# Patient Record
Sex: MALE | Race: WHITE | Employment: OTHER | URBAN - METROPOLITAN AREA
[De-identification: names, ages, dates, MRNs, and addresses within clinical notes are randomized per-mention and may not be internally consistent; named-entity substitution may affect disease eponyms.]

---

## 2021-02-02 ENCOUNTER — HOSPITAL ENCOUNTER (INPATIENT)
Age: 86
LOS: 3 days | Discharge: HOME | DRG: 881 | End: 2021-02-05
Attending: EMERGENCY MEDICINE | Admitting: PSYCHIATRY & NEUROLOGY
Payer: MEDICARE

## 2021-02-02 DIAGNOSIS — F32.A DEPRESSION WITH SUICIDAL IDEATION: Primary | ICD-10-CM

## 2021-02-02 DIAGNOSIS — R45.851 DEPRESSION WITH SUICIDAL IDEATION: Primary | ICD-10-CM

## 2021-02-02 PROBLEM — F32.9 MAJOR DEPRESSIVE DISORDER, SINGLE EPISODE: Status: ACTIVE | Noted: 2021-02-02

## 2021-02-02 LAB
ACETAMINOPHEN LEVEL: <5 MCG/ML (ref 10–30)
ALBUMIN SERPL-MCNC: 4.3 G/DL (ref 3.5–5.2)
ALP BLD-CCNC: 61 U/L (ref 40–129)
ALT SERPL-CCNC: 22 U/L (ref 0–40)
AMPHETAMINE SCREEN, URINE: NOT DETECTED
ANION GAP SERPL CALCULATED.3IONS-SCNC: 10 MMOL/L (ref 7–16)
AST SERPL-CCNC: 18 U/L (ref 0–39)
BARBITURATE SCREEN URINE: NOT DETECTED
BASOPHILS ABSOLUTE: 0.03 E9/L (ref 0–0.2)
BASOPHILS RELATIVE PERCENT: 0.4 % (ref 0–2)
BENZODIAZEPINE SCREEN, URINE: NOT DETECTED
BILIRUB SERPL-MCNC: 0.4 MG/DL (ref 0–1.2)
BILIRUBIN URINE: ABNORMAL
BLOOD, URINE: NEGATIVE
BUN BLDV-MCNC: 11 MG/DL (ref 8–23)
CALCIUM SERPL-MCNC: 9 MG/DL (ref 8.6–10.2)
CANNABINOID SCREEN URINE: NOT DETECTED
CHLORIDE BLD-SCNC: 103 MMOL/L (ref 98–107)
CLARITY: CLEAR
CO2: 29 MMOL/L (ref 22–29)
COCAINE METABOLITE SCREEN URINE: NOT DETECTED
COLOR: YELLOW
CREAT SERPL-MCNC: 1 MG/DL (ref 0.7–1.2)
EOSINOPHILS ABSOLUTE: 0.03 E9/L (ref 0.05–0.5)
EOSINOPHILS RELATIVE PERCENT: 0.4 % (ref 0–6)
ETHANOL: <10 MG/DL (ref 0–0.08)
FENTANYL SCREEN, URINE: NOT DETECTED
GFR AFRICAN AMERICAN: >60
GFR NON-AFRICAN AMERICAN: >60 ML/MIN/1.73
GLUCOSE BLD-MCNC: 103 MG/DL (ref 74–99)
GLUCOSE URINE: NEGATIVE MG/DL
HCT VFR BLD CALC: 55.7 % (ref 37–54)
HEMOGLOBIN: 17.6 G/DL (ref 12.5–16.5)
IMMATURE GRANULOCYTES #: 0.02 E9/L
IMMATURE GRANULOCYTES %: 0.3 % (ref 0–5)
KETONES, URINE: NEGATIVE MG/DL
LEUKOCYTE ESTERASE, URINE: NEGATIVE
LYMPHOCYTES ABSOLUTE: 0.87 E9/L (ref 1.5–4)
LYMPHOCYTES RELATIVE PERCENT: 12.5 % (ref 20–42)
Lab: NORMAL
MCH RBC QN AUTO: 29.1 PG (ref 26–35)
MCHC RBC AUTO-ENTMCNC: 31.6 % (ref 32–34.5)
MCV RBC AUTO: 92.1 FL (ref 80–99.9)
METHADONE SCREEN, URINE: NOT DETECTED
MONOCYTES ABSOLUTE: 0.46 E9/L (ref 0.1–0.95)
MONOCYTES RELATIVE PERCENT: 6.6 % (ref 2–12)
NEUTROPHILS ABSOLUTE: 5.55 E9/L (ref 1.8–7.3)
NEUTROPHILS RELATIVE PERCENT: 79.8 % (ref 43–80)
NITRITE, URINE: NEGATIVE
OPIATE SCREEN URINE: NOT DETECTED
OXYCODONE URINE: NOT DETECTED
PDW BLD-RTO: 14 FL (ref 11.5–15)
PH UA: 5.5 (ref 5–9)
PHENCYCLIDINE SCREEN URINE: NOT DETECTED
PLATELET # BLD: 152 E9/L (ref 130–450)
PMV BLD AUTO: 10.1 FL (ref 7–12)
POTASSIUM SERPL-SCNC: 4.1 MMOL/L (ref 3.5–5)
PROTEIN UA: NEGATIVE MG/DL
RBC # BLD: 6.05 E12/L (ref 3.8–5.8)
SALICYLATE, SERUM: <0.3 MG/DL (ref 0–30)
SARS-COV-2, NAAT: NOT DETECTED
SODIUM BLD-SCNC: 142 MMOL/L (ref 132–146)
SPECIFIC GRAVITY UA: >=1.03 (ref 1–1.03)
TOTAL PROTEIN: 7 G/DL (ref 6.4–8.3)
TRICYCLIC ANTIDEPRESSANTS SCREEN SERUM: NEGATIVE NG/ML
UROBILINOGEN, URINE: 0.2 E.U./DL
WBC # BLD: 7 E9/L (ref 4.5–11.5)

## 2021-02-02 PROCEDURE — U0002 COVID-19 LAB TEST NON-CDC: HCPCS

## 2021-02-02 PROCEDURE — 82077 ASSAY SPEC XCP UR&BREATH IA: CPT

## 2021-02-02 PROCEDURE — 80143 DRUG ASSAY ACETAMINOPHEN: CPT

## 2021-02-02 PROCEDURE — 81003 URINALYSIS AUTO W/O SCOPE: CPT

## 2021-02-02 PROCEDURE — 80053 COMPREHEN METABOLIC PANEL: CPT

## 2021-02-02 PROCEDURE — 80179 DRUG ASSAY SALICYLATE: CPT

## 2021-02-02 PROCEDURE — 1240000000 HC EMOTIONAL WELLNESS R&B

## 2021-02-02 PROCEDURE — 80307 DRUG TEST PRSMV CHEM ANLYZR: CPT

## 2021-02-02 PROCEDURE — 93005 ELECTROCARDIOGRAM TRACING: CPT | Performed by: EMERGENCY MEDICINE

## 2021-02-02 PROCEDURE — 99284 EMERGENCY DEPT VISIT MOD MDM: CPT

## 2021-02-02 PROCEDURE — 85025 COMPLETE CBC W/AUTO DIFF WBC: CPT

## 2021-02-02 RX ORDER — ACETAMINOPHEN 325 MG/1
650 TABLET ORAL EVERY 4 HOURS PRN
Status: DISCONTINUED | OUTPATIENT
Start: 2021-02-02 | End: 2021-02-05 | Stop reason: HOSPADM

## 2021-02-02 RX ORDER — HALOPERIDOL 1 MG/1
3 TABLET ORAL EVERY 6 HOURS PRN
Status: DISCONTINUED | OUTPATIENT
Start: 2021-02-02 | End: 2021-02-05 | Stop reason: HOSPADM

## 2021-02-02 RX ORDER — HALOPERIDOL 5 MG/ML
3 INJECTION INTRAMUSCULAR EVERY 6 HOURS PRN
Status: DISCONTINUED | OUTPATIENT
Start: 2021-02-02 | End: 2021-02-05 | Stop reason: HOSPADM

## 2021-02-02 RX ORDER — TRAZODONE HYDROCHLORIDE 50 MG/1
25 TABLET ORAL NIGHTLY PRN
Status: DISCONTINUED | OUTPATIENT
Start: 2021-02-02 | End: 2021-02-05 | Stop reason: HOSPADM

## 2021-02-02 RX ORDER — MAGNESIUM HYDROXIDE/ALUMINUM HYDROXICE/SIMETHICONE 120; 1200; 1200 MG/30ML; MG/30ML; MG/30ML
30 SUSPENSION ORAL PRN
Status: DISCONTINUED | OUTPATIENT
Start: 2021-02-02 | End: 2021-02-05 | Stop reason: HOSPADM

## 2021-02-02 ASSESSMENT — PATIENT HEALTH QUESTIONNAIRE - PHQ9: SUM OF ALL RESPONSES TO PHQ QUESTIONS 1-9: 8

## 2021-02-02 ASSESSMENT — SLEEP AND FATIGUE QUESTIONNAIRES
SLEEP PATTERN: DIFFICULTY FALLING ASLEEP
DIFFICULTY FALLING ASLEEP: YES
RESTFUL SLEEP: YES
DO YOU HAVE DIFFICULTY SLEEPING: YES
DIFFICULTY ARISING: NO
DIFFICULTY STAYING ASLEEP: NO

## 2021-02-02 ASSESSMENT — LIFESTYLE VARIABLES: HISTORY_ALCOHOL_USE: NO

## 2021-02-02 NOTE — ED PROVIDER NOTES
Department of Emergency Medicine   ED  Provider Note  Admit Date/RoomTime: 2021 12:26 PM  ED Room: [facility]              21  2:12 PM EST    HPI: Angela Amaro 80 y.o. male presents with a complaint of depression and suicidal ideation beginning a few days ago. Complaint has been constant and became more severe today which is what prompted the visit. Presents for evaluation depression and suicidal thoughts. He reports that he is depressed since he lost his partner 1 year ago. He reports he feels helpless and hopeless and has nothing to live for. He reports that he is too chicken to kill himself but is having suicidal thoughts. He says things continue to go downhill since she . He denies other complaints. Review of Systems:   A complete review of systems was performed and pertinent positives and negatives are stated within HPI, all other systems reviewed and are negative.            --------------------------------------------- PAST HISTORY ---------------------------------------------  Past Medical History:  has a past medical history of BPH (benign prostatic hyperplasia), CAD (coronary artery disease), Diverticulosis, Hyperlipidemia, Hypertension, and MI (myocardial infarction) ([facility] [address] Past Surgical History:  has a past surgical history that includes Neck surgery; Cardiac catheterization (2012); Coronary artery bypass graft (12); Diagnostic Cardiac Cath Lab Procedure; and Colonoscopy (). Social History:  reports that he has never smoked. He has never used smokeless tobacco. He reports that he does not drink alcohol or use drugs. Family History: family history includes Heart Attack in his mother; Heart Disease in his mother. Family history is non contributory unless otherwise noted    The patients home medications have been reviewed. Allergies: Patient has no known allergies. -------------------------------------------------- RESULTS -------------------------------------------------  All laboratory and imaging studies were reviewed by myself.     LABS: reviewed and interpreted by me  Results for orders placed or performed during the hospital encounter of 02/02/21   COVID-19   Result Value Ref Range    SARS-CoV-2, NAAT Not Detected Not Detected   Comprehensive Metabolic Panel   Result Value Ref Range    Sodium 142 132 - 146 mmol/L    Potassium 4.1 3.5 - 5.0 mmol/L    Chloride 103 98 - 107 mmol/L    CO2 29 22 - 29 mmol/L    Anion Gap 10 7 - 16 mmol/L    Glucose 103 (H) 74 - 99 mg/dL    BUN 11 8 - 23 mg/dL    CREATININE 1.0 0.7 - 1.2 mg/dL    GFR Non-African American >60 >=60 mL/min/1.73    GFR African American >60     Calcium 9.0 8.6 - 10.2 mg/dL    Total Protein 7.0 6.4 - 8.3 g/dL    Albumin 4.3 3.5 - 5.2 g/dL    Total Bilirubin 0.4 0.0 - 1.2 mg/dL    Alkaline Phosphatase 61 40 - 129 U/L    ALT 22 0 - 40 U/L    AST 18 0 - 39 U/L   CBC Auto Differential   Result Value Ref Range    WBC 7.0 4.5 - 11.5 E9/L    RBC 6.05 (H) 3.80 - 5.80 E12/L    Hemoglobin 17.6 (H) 12.5 - 16.5 g/dL    Hematocrit 55.7 (H) 37.0 - 54.0 %    MCV 92.1 80.0 - 99.9 fL    MCH 29.1 26.0 - 35.0 pg    MCHC 31.6 (L) 32.0 - 34.5 %    RDW 14.0 11.5 - 15.0 fL    Platelets 842 145 - 072 E9/L    MPV 10.1 7.0 - 12.0 fL    Neutrophils % 79.8 43.0 - 80.0 %    Immature Granulocytes % 0.3 0.0 - 5.0 %    Lymphocytes % 12.5 (L) 20.0 - 42.0 %    Monocytes % 6.6 2.0 - 12.0 %    Eosinophils % 0.4 0.0 - 6.0 %    Basophils % 0.4 0.0 - 2.0 %    Neutrophils Absolute 5.55 1.80 - 7.30 E9/L    Immature Granulocytes # 0.02 E9/L    Lymphocytes Absolute 0.87 (L) 1.50 - 4.00 E9/L    Monocytes Absolute 0.46 0.10 - 0.95 E9/L    Eosinophils Absolute 0.03 (L) 0.05 - 0.50 E9/L    Basophils Absolute 0.03 0.00 - 0.20 E9/L   Serum Drug Screen   Result Value Ref Range    Ethanol Lvl <10 mg/dL    Acetaminophen Level <5.0 (L) 10.0 - 30.0 mcg/mL Salicylate, Serum <0.6 0.0 - 30.0 mg/dL    TCA Scrn NEGATIVE Cutoff:300 ng/mL   Urine Drug Screen   Result Value Ref Range    Amphetamine Screen, Urine NOT DETECTED Negative <1000 ng/mL    Barbiturate Screen, Ur NOT DETECTED Negative < 200 ng/mL    Benzodiazepine Screen, Urine NOT DETECTED Negative < 200 ng/mL    Cannabinoid Scrn, Ur NOT DETECTED Negative < 50ng/mL    Cocaine Metabolite Screen, Urine NOT DETECTED Negative < 300 ng/mL    Opiate Scrn, Ur NOT DETECTED Negative < 300ng/mL    PCP Screen, Urine NOT DETECTED Negative < 25 ng/mL    Methadone Screen, Urine NOT DETECTED Negative <300 ng/mL    Oxycodone Urine NOT DETECTED Negative <100 ng/mL    FENTANYL SCREEN, URINE NOT DETECTED Negative <1 ng/mL    Drug Screen Comment: see below    Urinalysis   Result Value Ref Range    Color, UA Yellow Straw/Yellow    Clarity, UA Clear Clear    Glucose, Ur Negative Negative mg/dL    Bilirubin Urine SMALL (A) Negative    Ketones, Urine Negative Negative mg/dL    Specific Gravity, UA >=1.030 1.005 - 1.030    Blood, Urine Negative Negative    pH, UA 5.5 5.0 - 9.0    Protein, UA Negative Negative mg/dL    Urobilinogen, Urine 0.2 <2.0 E.U./dL    Nitrite, Urine Negative Negative    Leukocyte Esterase, Urine Negative Negative       RADIOLOGY:  Interpreted by Radiologist.  No orders to display       EKG Interpretation  Interpreted by emergency department physician, Dr. Lucy Brown     Rhythm: normal sinus   Rate: normal  Axis: left  Conduction: right bundle branch block (complete)  ST Segments: no acute change  T Waves: no acute change    Clinical Impression: Sinus rhythm, no acute ischemic changes    Comparison to Old EKG  Stable from prior        ------------------------- NURSING NOTES AND VITALS REVIEWED ---------------------------   The nursing notes within the ED encounter and vital signs as below have been reviewed. BP (!) 168/98   Pulse 86   Temp 97.5 °F (36.4 °C)   Resp 18   Ht 5' 10\" (1.778 m)   Wt 185 lb (83.9 kg)   SpO2 93%   BMI 26.54 kg/m²   Oxygen Saturation Interpretation: Normal            ---------------------------------------------------PHYSICAL EXAM--------------------------------------      Constitutional/General: Alert and oriented x3   Head: Normocephalic, atraumatic  Eyes: PERRL, EOMI  Mouth: Oropharynx clear, handling secretions, no trismus  Neck: Supple, full ROM, no meningeal signs  Pulmonary: Lungs clear to auscultation bilaterally, no wheezes, rales, or rhonchi. Not in respiratory distress  Cardiovascular:  Regular rate and rhythm, no murmurs, gallops, or rubs. 2+ distal pulses  Abdomen: Soft, non tender, non distended, +BS, no rebound, guarding, or rigidity. No pulsatile masses appreciated  Extremities: Moves all extremities x 4. Warm and well perfused, no clubbing, cyanosis, or edema. Capillary refill <3 seconds  Skin: warm and dry without rash  Neurologic: GCS 15, no focal deficits  Psych: flat Affect      ------------------------------------------ PROGRESS NOTES ------------------------------------------     Medical decision making:  Patient is medically stable for mental health evaluation    Consultations:   Behavioral Health    Re-Evaluations:        Counseling: The emergency provider has spoken with thepatient and discussed todays results, in addition to providing specific details for the plan of care and counseling regarding the diagnosis and prognosis. Questions are answered at this time and they are agreeable with the plan.         --------------------------------- IMPRESSION AND DISPOSITION ---------------------------------    IMPRESSION  1.  Depression with suicidal ideation        DISPOSITION  Disposition: as per consultant  Patient condition is stable           Marily Moon MD  02/02/21 [id number]

## 2021-02-03 LAB
CHOLESTEROL, TOTAL: 191 MG/DL (ref 0–199)
EKG ATRIAL RATE: 74 BPM
EKG P AXIS: -3 DEGREES
EKG P-R INTERVAL: 200 MS
EKG Q-T INTERVAL: 420 MS
EKG QRS DURATION: 108 MS
EKG QTC CALCULATION (BAZETT): 466 MS
EKG R AXIS: -44 DEGREES
EKG T AXIS: 13 DEGREES
EKG VENTRICULAR RATE: 74 BPM
HBA1C MFR BLD: 6 % (ref 4–5.6)
HDLC SERPL-MCNC: 44 MG/DL
LDL CHOLESTEROL CALCULATED: 105 MG/DL (ref 0–99)
TRIGL SERPL-MCNC: 212 MG/DL (ref 0–149)
TSH SERPL DL<=0.05 MIU/L-ACNC: 1.41 UIU/ML (ref 0.27–4.2)
VLDLC SERPL CALC-MCNC: 42 MG/DL

## 2021-02-03 PROCEDURE — 83036 HEMOGLOBIN GLYCOSYLATED A1C: CPT

## 2021-02-03 PROCEDURE — 6370000000 HC RX 637 (ALT 250 FOR IP): Performed by: NURSE PRACTITIONER

## 2021-02-03 PROCEDURE — 99222 1ST HOSP IP/OBS MODERATE 55: CPT | Performed by: NURSE PRACTITIONER

## 2021-02-03 PROCEDURE — 1240000000 HC EMOTIONAL WELLNESS R&B

## 2021-02-03 PROCEDURE — 84443 ASSAY THYROID STIM HORMONE: CPT

## 2021-02-03 PROCEDURE — 6370000000 HC RX 637 (ALT 250 FOR IP): Performed by: PSYCHIATRY & NEUROLOGY

## 2021-02-03 PROCEDURE — 93010 ELECTROCARDIOGRAM REPORT: CPT | Performed by: INTERNAL MEDICINE

## 2021-02-03 PROCEDURE — 80061 LIPID PANEL: CPT

## 2021-02-03 PROCEDURE — 36415 COLL VENOUS BLD VENIPUNCTURE: CPT

## 2021-02-03 RX ORDER — DULOXETIN HYDROCHLORIDE 20 MG/1
20 CAPSULE, DELAYED RELEASE ORAL 2 TIMES DAILY
Status: DISCONTINUED | OUTPATIENT
Start: 2021-02-03 | End: 2021-02-05 | Stop reason: HOSPADM

## 2021-02-03 RX ORDER — ASPIRIN 81 MG/1
81 TABLET, CHEWABLE ORAL DAILY
Status: DISCONTINUED | OUTPATIENT
Start: 2021-02-03 | End: 2021-02-05 | Stop reason: HOSPADM

## 2021-02-03 RX ORDER — LISINOPRIL 10 MG/1
10 TABLET ORAL DAILY
Status: DISCONTINUED | OUTPATIENT
Start: 2021-02-03 | End: 2021-02-04

## 2021-02-03 RX ORDER — ATORVASTATIN CALCIUM 20 MG/1
20 TABLET, FILM COATED ORAL NIGHTLY
Status: DISCONTINUED | OUTPATIENT
Start: 2021-02-03 | End: 2021-02-04

## 2021-02-03 RX ADMIN — ASPIRIN 81 MG: 81 TABLET, CHEWABLE ORAL at 13:54

## 2021-02-03 RX ADMIN — METOPROLOL TARTRATE 25 MG: 25 TABLET, FILM COATED ORAL at 21:01

## 2021-02-03 RX ADMIN — ATORVASTATIN CALCIUM 20 MG: 20 TABLET, FILM COATED ORAL at 21:01

## 2021-02-03 RX ADMIN — LISINOPRIL 10 MG: 10 TABLET ORAL at 16:41

## 2021-02-03 RX ADMIN — ACETAMINOPHEN 650 MG: 325 TABLET, FILM COATED ORAL at 14:19

## 2021-02-03 RX ADMIN — METOPROLOL TARTRATE 25 MG: 25 TABLET, FILM COATED ORAL at 13:54

## 2021-02-03 RX ADMIN — DULOXETINE HYDROCHLORIDE 20 MG: 20 CAPSULE, DELAYED RELEASE ORAL at 21:01

## 2021-02-03 ASSESSMENT — SLEEP AND FATIGUE QUESTIONNAIRES
SLEEP PATTERN: DIFFICULTY FALLING ASLEEP
RESTFUL SLEEP: YES
DIFFICULTY ARISING: NO
DIFFICULTY FALLING ASLEEP: YES

## 2021-02-03 ASSESSMENT — PATIENT HEALTH QUESTIONNAIRE - PHQ9: SUM OF ALL RESPONSES TO PHQ QUESTIONS 1-9: 8

## 2021-02-03 ASSESSMENT — LIFESTYLE VARIABLES: HISTORY_ALCOHOL_USE: NO

## 2021-02-03 NOTE — H&P
Department of Psychiatry  History and Physical - Adult     CHIEF COMPLAINT: \"Things gone downhill since my wife . \"  Patient was seen after discussing with the treatment team and reviewing the chart    CIRCUMSTANCES OF ADMISSION:   Patient made statements at the bank that he wanted to hurt himself, he was then presented to [facility] emergency department, with depression feelings of helplessness, passive suicidal ideation. HISTORY OF PRESENT ILLNESS:      The patient is a 80 y.o.  male who is recently  and has 2 estranged adult children, who follows with the [place] for medical management, presented to [facility] emergency department for making statements at the bank that he wanted to harm himself, further in the emergency department he expressed feelings of looseness with passive suicidal ideation and depression beginning after the loss of his wife. His toxicology and EtOH screen in the ED were negative, he was medically cleared and admitted to [facility] for psychiatric evaluation and stabilization. Upon assessment today the patient is polite and appropriate, he is easily able to recount the events leading to his hospitalization. He is able to answer questions with relevance and is alert and oriented to person place time and situation. He tells me that he has been sad with feelings of helplessness depression since the loss of his wife 1 year ago. States that they were together for 29 years. He is sad and depressed. Describing himself as \"heart sick. \"  He states that he has an appointment with the [place] for his grief yesterday but missed the appointment because he was in the emergency department at the hospital.  He denies auditory or visual hallucinations or other perceptual disturbances. He states his mood is \"sad. \"  His affect is depressed. He is lonely. He denies suicidal or homicidal ideation intent or plan, stating that he would rather \"go to sleep and not wake up. \"  He states that he is \"too much of a chicken. \"  To harm himself. 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 He lives alone, and recently sold his house.     MSE: Mental status reveals an 59-year-old  male in hospital attire who appears stated age with fair hygiene and fair grooming he is superficially forthcoming with information and easily able to recount the events leading to his hospitalization. Psychomotor reveals no retardation, agitation or abnormal posturing. Speech is clear there is no pressured rate rhythm or tone there is no delayed latency of response. Eye contact is good. Mood is \"sad. \"  Affect is depressed. Thought process is logical and there are no loose associations or flight of ideas. Thought content is the patient is devoid of suicidal or homicidal ideation intent or plan. Devoid of auditory or visual hallucinations or other perceptual disturbances, there are no overt or covert signs of psychosis or paranoia. There areneurovegetative signs of depression including feelings of helplessness, loneliness,. Past Medical History:        Diagnosis Date    BPH (benign prostatic hyperplasia)     CAD (coronary artery disease)     Diverticulosis     Hyperlipidemia     Hypertension     [facility] ([facility] infarction) ([facility] [address]     RBBB     Tobacco abuse        Medications Prior to Admission:   Medications Prior to Admission: metoprolol (LOPRESSOR) 25 MG tablet, Take 25 mg by mouth 2 times daily  Fish Oil OIL, Take  by mouth daily. aspirin 81 MG chewable tablet, Take 1 tablet by mouth daily. simvastatin (ZOCOR) 20 MG tablet, Take 1 tablet by mouth nightly. finasteride (PROSCAR) 5 MG tablet, Take 5 mg by mouth daily. therapeutic multivitamin-minerals (THERAGRAN-M) tablet, Take 1 tablet by mouth daily. Past Surgical History:        Procedure Laterality Date    CARDIAC CATHETERIZATION  4-    Dr. Tunde Damon    COLONOSCOPY  2013    CORONARY ARTERY BYPASS GRAFT  4/18/12    acb x 3- Dr. Alyse Trejo LAB PROCEDURE      NECK SURGERY         Allergies:   Patient has no known allergies.     Family History OPIATESCREENURINE NOT DETECTED 02/02/2021    PHENCYCLIDINESCREENURINE NOT DETECTED 02/02/2021    ETOH <10 02/02/2021     Lab Results   Component Value Date    TSH 2.390 04/18/2012     No results found for: LITHIUM  No results found for: VALPROATE, CBMZ  No results found for: LITHIUM, VALPROATE      Radiology No results found. TREATMENT PLAN:  The plan of care and medications have been reviewed with Dr. Margaux Mas in the collaborative care team.  Risk Management: Based on the diagnosis and assessment biopsychosocial treatment model was presented to the patient and was given the opportunity to ask any question. The patient was agreeable to the plan and all the patient's questions were answered to the patient's satisfaction. I discussed with the patient the risk, benefit, alternative and common side effects for the proposed medication treatment. The patient is consenting to this treatment. Collateral Information:  Will obtain collateral information from the family or friends. Will obtain medical records as appropriate from out patient providers  Will consult the hospitalist for a physical exam to rule out any co-morbid physical condition. Home medication Reconciled       New Medications started during this admission :    Duloxetine 20 mg twice daily for depression    Prn Haldol 5mg and Vistaril 50mg q6hr for extreme agitation. Trazodone as ordered for insomnia  Vistaril as ordered for anxiety      Psychotherapy:   Encourage participation in milieu and group therapy  Individual therapy as needed  Patient encouraged to follow-up with the services      Behavioral Services  Medicare Certification      Admission Day 1  I certify that this patient's inpatient psychiatric hospital admission is medically necessary for:     (1) treatment which could reasonably be expected to improve this patient's condition, or     (2) diagnostic study or its equivalent. Electronically signed by JENNA Yost CNP on 2/3/2021 at 1:22 PM

## 2021-02-03 NOTE — PROGRESS NOTES
[facility]  Initial Interdisciplinary Treatment Plan NOTE    Review Date & Time: 2/3/2021    Patient was in treatment team    Admission Type:   Admission Type:  Involuntary    Reason for admission:  Reason for Admission: \"to get evaluated\"      Estimated Length of Stay Update:  3-5 days  Estimated Discharge Date Update: 2/8/2021    PATIENT STRENGTHS:  Patient Strengths Strengths: Communication  Patient Strengths and Limitations:Limitations: Multiple barriers to leisure interests  Addictive Behavior:Addictive Behavior  In the past 3 months, have you felt or has someone told you that you have a problem with:  : None  Do you have a history of Chemical Use?: No  Do you have a history of Alcohol Use?: No  Do you have a history of Street Drug Abuse?: No  Histroy of Prescripton Drug Abuse?: No  Medical Problems:  Past Medical History:   Diagnosis Date    BPH (benign prostatic hyperplasia)     CAD (coronary artery disease)     Diverticulosis     Hyperlipidemia     Hypertension     [facility] (my[facility]) ([facility] [address]     RBBB     Tobacco abuse        EDUCATION:   Learner Progress Toward Treatment Goals: Reviewed results and recommendations of this team, Reviewed group plan and strategies, Reviewed signs, symptoms and risk of self harm and violent behavior and Reviewed goals and plan of care    Method: Small group    Outcome: Verbalized understanding and Needs reinforcement    PATIENT GOALS:  Get some sleep    PLAN/TREATMENT RECOMMENDATIONS UPDATE: will continue to support patient in setting and obtaining both short term and long term goals while on the unit    GOALS UPDATE:   Time frame for Short-Term Goals: 1-3 days    Iván Archibald RN

## 2021-02-03 NOTE — PROGRESS NOTES
Messages left at [phone] and [phone] on nurse line at the [facility] for medications verification. Patient is being seen by Dr. Melo Mae team 7 provider. Still awaiting return call for continuations of medical care. [id number]  Follow up done with [address]. State they do not have a current list of meds beside vitamins. Per staff the vitamins are listed as from outside provider. Patient states he does not see anyone else as a provider beside the [place] and that he has not been taking meds for about 5 years and needs to start back on something.

## 2021-02-03 NOTE — PROGRESS NOTES
Patient has been out on the unit sitting in reclining chair throughout the night. States that he cannot sleep very well at night since his wife passed away. Patient is calm and cooperative during conversation. Makes needs known. Denies suicidal/homicidal ideations and hallucinations at this time. Purposeful rounding continued.

## 2021-02-03 NOTE — PROGRESS NOTES
Patient reports that he was at the [employer] and the bank asked him if he would mind if he came to the hospital for an evaluation. Patient reports that he has had suicidal thoughts but reports that he would never do it because he is \"too much of a coward\". Patient is pleasant and cooperative during admission assessment. Patient did not wish to sign admission paperwork and reports that he would like to speak with his  first, otherwise is cooperative with admission. Admits to depression and rates it \"more than a 5\". Patient reports that he sees Dr. Samantha Wilson at the [facility]. Admits that he has not been compliant with prescribed medications. Patient reports an increase in depression after his wife of 29 years passed away in March of 2020. Reports that he lost about 20 pounds when she passed away but has been gaining it back. Also reports that he has had some trouble sleeping since her passing away, about 3 hours a night, but is \"getting better\". Denies taking any medications for sleep at this time. Reports that he lives alone in a 2 bedroom apartment. Patient reports that he does not have a relationship with his children due to the divorce from their mother, his first wife. Reports that his friends are his support system. No unit issues reported. Will continue to observe and support.

## 2021-02-03 NOTE — CONSULTS
Hospital Medicine  Consult History & Physical        Reason for consult:  Medical management    Date of Service: Pt seen/examined in consultation on 2/3/2021    History Of Present Illness:    Mr. Tasha Mendieta, a 80y.o. year old male  who  has a past medical history of BPH (benign prostatic hyperplasia), CAD (coronary artery disease), Diverticulosis, Hyperlipidemia, Hypertension, MI (myocardial infarction) ([address] and Tobacco abuse. Patient presented to the ED with suicidal ideation beginning 3 days prior to arrival.  He states that he has been depressed since he lost his partner 1 year ago. He does not have a plan and states that he is too afraid to actually kill himself. He was admitted to [facility] and we were asked to see/evaluate the patient for medical management. The patient states that he does see his PCP at the  [facility], but he has not taken his medication since his wife  approximately 1 year ago. He is a poor historian, but notes that he has high blood pressure and history of a CABG. He has no complaints at this time, but still voices suicidal ideation. Past Medical History:        Diagnosis Date    BPH (benign prostatic hyperplasia)     CAD (coronary artery disease)     Diverticulosis     Hyperlipidemia     Hypertension     MI (myocardial infarction) ([address]     Tobacco abuse        Past Surgical History:        Procedure Laterality Date    CARDIAC CATHETERIZATION  2012    Dr. John Clemente    COLONOSCOPY      CORONARY ARTERY BYPASS GRAFT  12    acb x 3- Dr. Valerie Martinez         Medications Prior to Admission:    Prior to Admission medications    Medication Sig Start Date End Date Taking? Authorizing Provider   metoprolol (LOPRESSOR) 25 MG tablet Take 25 mg by mouth 2 times daily    Historical Provider, MD   Fish Oil OIL Take  by mouth daily.     Historical Provider, MD aspirin 81 MG chewable tablet Take 1 tablet by mouth daily. 4/24/12   Khoi Gregory DO   simvastatin (ZOCOR) 20 MG tablet Take 1 tablet by mouth nightly. 4/24/12   Khoi Gregory DO   finasteride (PROSCAR) 5 MG tablet Take 5 mg by mouth daily. Historical Provider, MD   therapeutic multivitamin-minerals (THERAGRAN-M) tablet Take 1 tablet by mouth daily. Historical Provider, MD       Allergies:  Patient has no known allergies. Social History:      TOBACCO:   reports that he has never smoked. He has never used smokeless tobacco.  ETOH:   reports no history of alcohol use. Family History:    Positive as follows:        Problem Relation Age of Onset    Heart Attack Mother     Heart Disease Mother        REVIEW OF SYSTEMS:   Pertinent positives as noted in the HPI. All other systems reviewed and negative. PHYSICAL EXAM:  BP (!) 195/114   Pulse 78   Temp 97.5 °F (36.4 °C) (Temporal)   Resp 17   Ht 5' 10\" (1.778 m)   Wt 185 lb (83.9 kg)   SpO2 94%   BMI 26.54 kg/m²      General appearance: Elderly gentleman in no apparent distress, appears stated age and cooperative. HEENT: Normal cephalic, atraumatic without obvious deformity. Pupils equal, round, and reactive to light. Extra ocular muscles intact. Conjunctivae/corneas clear. Neck: Supple, with full range of motion. No jugular venous distention. Trachea midline. Respiratory:  Rhonchi that clears with coughing then CTA throughout. No apparent distress. Cardiovascular:  Regular rate and rhythm. S1, S2 without murmurs, rubs, or gallops. PV: Brisk capillary refill. +2 pedal and radial pulses bilaterally. No clubbing or cyanosis. +1 pitting edema of bilateral lower extremities. Abdomen: Soft, non-tender, non-distended. +BS  Musculoskeletal: No obvious deformities or erythematous or edematous joints. Skin: Normal skin color. No rashes or lesions. Neurologic:  Neurovascularly intact without any focal sensory/motor deficits. Cranial nerves: II-XII intact, grossly non-focal.  Psychiatric: Alert and oriented, thought content appropriate, normal insight    Labs:     CBC:   Recent Labs     02/02/21  1336   WBC 7.0   RBC 6.05*   HGB 17.6*   HCT 55.7*   MCV 92.1   RDW 14.0        BMP:   Recent Labs     02/02/21  1336      K 4.1      CO2 29   BUN 11   CREATININE 1.0     LFT:  Recent Labs     02/02/21  1336   PROT 7.0   ALKPHOS 61   ALT 22   AST 18   BILITOT 0.4     Hgb A1C:   Lab Results   Component Value Date    LABA1C 5.5 04/22/2012     D Dimer:   Lab Results   Component Value Date    DDIMER <250 04/16/2012     Thyroid Studies:   Lab Results   Component Value Date    TSH 2.390 04/18/2012     ASSESSMENT:  Principal Problem:    Major depressive disorder, single episode  Active Problems:    3-vessel CAD    S/P CABG x 3    HTN (hypertension)    Hyperlipidemia    BPH (benign prostatic hyperplasia)    Tobacco abuse    RBBB  Resolved Problems:    * No resolved hospital problems. *     PLAN:  Check lipids, hemoglobin A1c, TSH  Metoprolol 25 BID, lisinopril 10 mg,   ASA, Lipitor 20 mg  Monitor BP  Nurses attempting to obtain med list from PCP. Thanks for allowing us to participate in the care of Karma Whaley. We will continue to follow along. Please do not hesitate to contact us if needed.    +++++++++++++++++++++++++++++++++++++++++++++++++  [facility] [place] [address]  +++++++++++++++++++++++++++++++++++++++++++++++++  NOTE: This report was transcribed using voice recognition software. Every effort was made to ensure accuracy; however, inadvertent computerized transcription errors may be present.

## 2021-02-03 NOTE — PROGRESS NOTES
`[facility]  Admission Note     Admission Type:   Admission Type: Involuntary    Reason for admission:  Reason for Admission: \"to get evaluated\"    PATIENT STRENGTHS:  Strengths: Communication, Social Skills, Positive Support    Patient Strengths and Limitations:  Limitations: Multiple barriers to leisure interests    Addictive Behavior:   Addictive Behavior  In the past 3 months, have you felt or has someone told you that you have a problem with:  : None  Do you have a history of Chemical Use?: No  Do you have a history of Alcohol Use?: No  Do you have a history of Street Drug Abuse?: No  Histroy of Prescripton Drug Abuse?: No    Medical Problems:   Past Medical History:   Diagnosis Date    BPH (benign prostatic hyperplasia)     CAD (coronary artery disease)     Diverticulosis     Hyperlipidemia     Hypertension     MI (myocardial infarction) ([facility] [address]        Status EXAM:  Status and Exam  Normal: No  Facial Expression: Flat(brightens with conversation)  Affect: Appropriate  Level of Consciousness: Alert  Mood:Normal: No  Mood: Depressed(depression \"above 5\" denies anxiety)  Motor Activity:Normal: Yes  Interview Behavior: Cooperative  Preception: [place] to Person, [place] to Time, [place] to Place, [place] to Situation  Attention:Normal: Yes  Thought Processes: Other(See comment)(organized)  Thought Content:Normal: Yes  Hallucinations:  Other (Comment)(\"I see visions out of no where, figures\")  Delusions: No  Memory:Normal: No  Memory: Other(See comment)(impaired)  Insight and Judgment: No  Insight and Judgment: Other(See comment)(patient does appear to have some insight)  Present Suicidal Ideation: No  Present Homicidal Ideation: No    Tobacco Screening:  Practical Counseling, on admission, nimco X, if applicable and completed (first 3 are required if patient doesn't refuse):            ( )  Recognizing danger situations (included triggers and roadblocks) ( )  Coping skills (new ways to manage stress, exercise, relaxation techniques, changing routine, distraction)                                                           ( )  Basic information about quitting (benefits of quitting, techniques in how to quit, available resources  ( ) Referral for counseling faxed to [facility]                                           ( ) Patient refused counseling  ( x) Patient has not smoked in the last 30 days    Metabolic Screening:    Lab Results   Component Value Date    LABA1C 5.5 04/22/2012       No results for input(s): CHOL, TRIG, HDL, LDLCALC, LABVLDL in the last 72 hours. Body mass index is 26.54 kg/m². BP Readings from Last 2 Encounters:   02/02/21 (!) 173/83   05/28/14 118/76           Pt admitted with followings belongings:  Dentures: None  Vision - Corrective Lenses: Glasses  Hearing Aid: None  Jewelry: Ring  Body Piercings Removed: N/A  Clothing: Jacket / coat, Shirt, Other (Comment)(hat, shirt, jacket)  Were All Patient Medications Collected?: Not Applicable  Other Valuables: Cell phone, [employer], Other (Comment)(cellphone, key, 2 pocket books, treasury check)     Valuables sent home with n/a. Valuables placed in safe in security envelope, number:  n/a. Patient's home medications were n/a. Patient oriented to surroundings and program expectations and copy of patient rights given. Received admission packet:  yes. Consents reviewed, signed no, pt wants to discuss with his  before signing. Refused to sign paperwork tonight. Patient verbalize understanding:  yes.     Patient education on precautions: yes                   Ludmila Page RN

## 2021-02-03 NOTE — GROUP NOTE
Group Therapy Note    Date: 2/3/2021    Group Start Time: 1010  Group End Time: 1100  Group Topic: Psychoeducation    [facility]    Dorene Pichardo, CTRS        Group Therapy Note    Number of participants: 7  Type of group: Psychoeducation  Mode of intervention: Education, Support, Socialization, Exploration, Clarifying, and Problem-solving  Topic: Tips for Managing Stress  Objective: Pt will identify 1 way to manage stress in recovery. Patient's Goal:  \"Get some sleep\"     Notes:  Pt was interactive during group sharing 1 way to manage stress in recovery. Pt gave support and feedback to others. Status After Intervention:  Improved    Participation Level:  Active Listener and Interactive    Participation Quality: Appropriate, Attentive, Sharing and Supportive      Speech:  normal      Thought Process/Content: Logical      Affective Functioning: Congruent      Mood: euthymic      Level of consciousness:  Alert, Oriented x4 and Attentive      Response to Learning: Able to verbalize current knowledge/experience, Able to verbalize/acknowledge new learning, Able to retain information, Capable of insight, Able to change behavior and Progressing to goal      Endings: None Reported    Modes of Intervention: Education, Support, Socialization, Exploration, Clarifying and Problem-solving

## 2021-02-03 NOTE — PROGRESS NOTES
Unable to verify patient's home medications due to [address] being closed at this time.

## 2021-02-03 NOTE — PLAN OF CARE
Problem: Altered Mood, Depressive Behavior:  Goal: Ability to disclose and discuss suicidal ideas will improve  Description: Ability to disclose and discuss suicidal ideas will improve  Outcome: Met This Shift

## 2021-02-03 NOTE — PLAN OF CARE
Patient is alert and oriented sitting in day area states he is depressed and did not assign a number just states it is just high because he is missing his wife. Patient states he is sad because his wife has passed and they were  for 29 years. Patient does not have any belief in in seeing his wife in an after life so he feels that because of that he has lost her forever. He continues to have a death wish but states he does not believe in suicide and would never do anything to harm himself he is to scared of that. Patient denies auditory or visual hallucinations at this time. He is cooperative and attentive to talking.         Problem: Altered Mood, Depressive Behavior:  Goal: Able to verbalize and/or display a decrease in depressive symptoms  Description: Able to verbalize and/or display a decrease in depressive symptoms  2/3/2021 1046 by Steven Saleh RN  Outcome: Ongoing     Problem: Altered Mood, Depressive Behavior:  Goal: Ability to disclose and discuss suicidal ideas will improve  Description: Ability to disclose and discuss suicidal ideas will improve  2/3/2021 1046 by Steven Saleh RN  Outcome: Ongoing

## 2021-02-03 NOTE — ED NOTES
ASSIGNED [id number] TO [address] [place] [address] 7 A Aurelio Reilly, [facility]  02/02/21 1276
[facility] WITH [facility]CT NUMBER    PER HITESH FREEMAN [phone] X- [id number] CARE MANAGER - THERE WERE NO PSYCH BEDS EARLIER TODAY      Maritza Yuen [facility], [place]  02/02/21 9161
Spoke to Dr Jennifer Garcia about admission. Patient accepted. LSW notified.      Cr Patterson RN  02/02/21 1998
Substance Use/Alcohol Use/Addiction: SBIRT Screen Complete. [] Reports:   [x] Denies     Trauma History  [x] Reports: DEATH OF HIS WIFE  [] Denies     Collateral Information:   NO COLLATERAL OBTAINED AT THIS TIME.     Level of Care/Disposition Plan  [] Home:   [] Outpatient Provider:   [] Crisis Unit:   [x] Inpatient Psychiatric Unit:  [] Other:        ELIZA Gibson  02/02/21 8028

## 2021-02-04 PROCEDURE — 1240000000 HC EMOTIONAL WELLNESS R&B

## 2021-02-04 PROCEDURE — 6370000000 HC RX 637 (ALT 250 FOR IP): Performed by: NURSE PRACTITIONER

## 2021-02-04 PROCEDURE — 99231 SBSQ HOSP IP/OBS SF/LOW 25: CPT | Performed by: NURSE PRACTITIONER

## 2021-02-04 RX ORDER — LISINOPRIL 20 MG/1
40 TABLET ORAL DAILY
Status: DISCONTINUED | OUTPATIENT
Start: 2021-02-05 | End: 2021-02-05 | Stop reason: HOSPADM

## 2021-02-04 RX ORDER — ATORVASTATIN CALCIUM 40 MG/1
40 TABLET, FILM COATED ORAL NIGHTLY
Status: DISCONTINUED | OUTPATIENT
Start: 2021-02-04 | End: 2021-02-05 | Stop reason: HOSPADM

## 2021-02-04 RX ADMIN — LISINOPRIL 10 MG: 10 TABLET ORAL at 09:07

## 2021-02-04 RX ADMIN — METOPROLOL TARTRATE 25 MG: 25 TABLET, FILM COATED ORAL at 09:08

## 2021-02-04 RX ADMIN — ASPIRIN 81 MG: 81 TABLET, CHEWABLE ORAL at 09:08

## 2021-02-04 RX ADMIN — DULOXETINE HYDROCHLORIDE 20 MG: 20 CAPSULE, DELAYED RELEASE ORAL at 21:03

## 2021-02-04 RX ADMIN — DULOXETINE HYDROCHLORIDE 20 MG: 20 CAPSULE, DELAYED RELEASE ORAL at 09:07

## 2021-02-04 RX ADMIN — ATORVASTATIN CALCIUM 40 MG: 40 TABLET, FILM COATED ORAL at 21:02

## 2021-02-04 RX ADMIN — LISINOPRIL 30 MG: 20 TABLET ORAL at 12:02

## 2021-02-04 RX ADMIN — METOPROLOL TARTRATE 25 MG: 25 TABLET, FILM COATED ORAL at 21:03

## 2021-02-04 NOTE — PROGRESS NOTES
BEHAVIORAL HEALTH FOLLOW-UP NOTE     2/4/2021     Patient was seen and examined in person, Chart reviewed   Patient's case discussed with staff/team    Chief Complaint: \"I am still sad, I'll always be sad since I've lost her. \"    Interim History:   Patient assessed in the day room this morning. Is social and appropriate has been observed interacting with peers on the unit. He is participating in milieu activities. Denies suicidal ideation, continues to state that he would be okay if he \"went to sleep and never woke up. \"  Patient is still grieving the loss of his significant other. He is devoid of AVH or other perceptual disturbances. He is not homicidal.  MoCA scoring ordered. Appetite:   [x] Normal/Unchanged  [] Increased  [] Decreased      Sleep:       [x] Normal/Unchanged  [] Fair       [] Poor              Energy:    [x] Normal/Unchanged  [] Increased  [] Decreased        SI [] Present  [x] Absent    HI  []Present  [x] Absent     Aggression:  [] yes  [x] no    Patient is [x] able  [] unable to CONTRACT FOR SAFETY     PAST MEDICAL/PSYCHIATRIC HISTORY:   Past Medical History:   Diagnosis Date    BPH (benign prostatic hyperplasia)     CAD (coronary artery disease)     Diverticulosis     Hyperlipidemia     Hypertension     MI (myocardial infarction) ([facility] [address]     RBBB     Tobacco abuse        FAMILY/SOCIAL HISTORY:  Family History   Problem Relation Age of Onset    Heart Attack Mother     Heart Disease Mother      Social History     Socioeconomic History    Marital status:       Spouse name: Not on file    Number of children: Not on file    Years of education: Not on file    Highest education level: Not on file   Occupational History    Not on file   Social Needs    Financial resource strain: Not on file    Food insecurity     Worry: Not on file     Inability: Not on file    Transportation needs     Medical: Not on file     Non-medical: Not on file   Tobacco Use  Smoking status: Never Smoker    Smokeless tobacco: Never Used   Substance and Sexual Activity    Alcohol use: No    Drug use: No    Sexual activity: Not Currently   Lifestyle    Physical activity     Days per week: Not on file     Minutes per session: Not on file    Stress: Not on file   Relationships    Social connections     Talks on phone: Not on file     Gets together: Not on file     Attends Evangelical service: Not on file     Active member of club or organization: Not on file     Attends meetings of clubs or organizations: Not on file     Relationship status: Not on file    Intimate partner violence     Fear of current or ex partner: Not on file     Emotionally abused: Not on file     Physically abused: Not on file     Forced sexual activity: Not on file   Other Topics Concern    Not on file   Social History Narrative    Not on file           ROS:  [x] All negative/unchanged except if checked.  Explain positive(checked items) below:  [] Constitutional  [] Eyes  [] Ear/Nose/Mouth/Throat  [] Respiratory  [] CV  [] GI  []   [] Musculoskeletal  [] Skin/Breast  [] Neurological  [] Endocrine  [] Heme/Lymph  [] Allergic/Immunologic    Explanation:     MEDICATIONS:    Current Facility-Administered Medications:     [START ON 2/5/2021] lisinopril (PRINIVIL;ZESTRIL) tablet 40 mg, 40 mg, Oral, Daily, Lula D Irl Salts, APRN - NP    atorvastatin (LIPITOR) tablet 40 mg, 40 mg, Oral, Nightly, Lula D Irl Salts, APRN - NP    metoprolol tartrate (LOPRESSOR) tablet 25 mg, 25 mg, Oral, BID, Teryl Pyo, APRN - NP, 25 mg at 02/04/21 0908    aspirin chewable tablet 81 mg, 81 mg, Oral, Daily, Teryl Pyo, APRN - NP, 81 mg at 02/04/21 0908    DULoxetine (CYMBALTA) extended release capsule 20 mg, 20 mg, Oral, BID, Austin Brooks, APRN - CNP, 20 mg at 02/04/21 0907    acetaminophen (TYLENOL) tablet 650 mg, 650 mg, Oral, Q4H PRN, Regina Gardiner MD, 650 mg at 02/03/21 1417 1336      K 4.1      CO2 29   BUN 11   CREATININE 1.0   GLUCOSE 103*     Recent Labs     02/02/21  1336   BILITOT 0.4   ALKPHOS 61   AST 18   ALT 22     Lab Results   Component Value Date    LABAMPH NOT DETECTED 02/02/2021    BARBSCNU NOT DETECTED 02/02/2021    LABBENZ NOT DETECTED 02/02/2021    LABMETH NOT DETECTED 02/02/2021    OPIATESCREENURINE NOT DETECTED 02/02/2021    PHENCYCLIDINESCREENURINE NOT DETECTED 02/02/2021    ETOH <10 02/02/2021     Lab Results   Component Value Date    TSH 1.410 02/03/2021     No results found for: LITHIUM  No results found for: VALPROATE, CBMZ    Treatment Plan:  Plan of care and medications have been reviewed with Dr. Christoph Garnica and the collaborative care team.  Reviewed current Medications with the patient. Risk, benefit, side effects, possible outcomes of the medication and alternatives discussed with the patient and the patient demonstrated understanding. The patient was also educated that the outcome of treatment will depend on the medication compliance as directed by the prescribers along with regular follow-up, compliance with the labs and other work-up, as clinically indicated. Risks, benefits, side effects, drug-to-drug interactions and alternatives to treatment were discussed. Duloxetine 20 mg twice daily for depression  Collateral information: Followed by social work  CD evaluation  Encourage patient to attend group and other milieu activities.   Discharge planning discussed with the patient and treatment team.    PSYCHOTHERAPY/COUNSELING:  [x] Therapeutic interview  [x] Supportive  [] CBT  [] Ongoing  [] Other    [x] Patient continues to need, on a daily basis, active treatment furnished directly by or requiring the supervision of inpatient psychiatric personnel      Anticipated Length of stay: 3 - 5 days based on stability            Electronically signed by JENNA Winter CNP on 2/4/2021 at 1:07 PM

## 2021-02-04 NOTE — PROGRESS NOTES
Hospitalist Progress Note      Chart reviewed. Remains hypertensive. Will increase lisinopril given borderline bradycardia. Monitor BP. Lipitor increased. Will check CBC and BMP tomorrow.    Non-billable rounding.      +++++++++++++++++++++++++++++++++++++++++++++++++  Christal Spencer, [facility] [address]

## 2021-02-04 NOTE — PROGRESS NOTES
Group Therapy Note  Patient attended goals group and stated daily goal as to get better and work on the loss of my wife. Group Therapy Note    Date: 2/4/2021  Start Time:10:00  End Time:  10:30  Number of Participants: 4    Type of Group: Psychoeducation    Wellness Binder Information  Module Name: Coping Skills  Session Number: NA    Patient's Goal:  To id positive coping skills to use on a daily basis. Notes: attended group and was able to participate and share how he has had a rough year dealing with the loss of his wife. Status After Intervention:  Improved    Participation Level:  Active Listener and Interactive    Participation Quality: Attentive and Sharing      Speech:  hesitant      Thought Process/Content: Linear      Affective Functioning: Flat      Mood: anxious and depressed      Level of consciousness:  Alert      Response to Learning: Progressing to goal      Endings: None Reported    Modes of Intervention: Education      Discipline Responsible: Psychoeducational Specialist      Signature:  ELIZA Weber

## 2021-02-05 VITALS
TEMPERATURE: 97.7 F | HEART RATE: 63 BPM | SYSTOLIC BLOOD PRESSURE: 142 MMHG | DIASTOLIC BLOOD PRESSURE: 68 MMHG | WEIGHT: 185 LBS | OXYGEN SATURATION: 93 % | HEIGHT: 70 IN | BODY MASS INDEX: 26.48 KG/M2 | RESPIRATION RATE: 16 BRPM

## 2021-02-05 LAB
ANION GAP SERPL CALCULATED.3IONS-SCNC: 12 MMOL/L (ref 7–16)
BUN BLDV-MCNC: 18 MG/DL (ref 8–23)
CALCIUM SERPL-MCNC: 9 MG/DL (ref 8.6–10.2)
CHLORIDE BLD-SCNC: 104 MMOL/L (ref 98–107)
CO2: 25 MMOL/L (ref 22–29)
CREAT SERPL-MCNC: 1 MG/DL (ref 0.7–1.2)
GFR AFRICAN AMERICAN: >60
GFR NON-AFRICAN AMERICAN: >60 ML/MIN/1.73
GLUCOSE BLD-MCNC: 103 MG/DL (ref 74–99)
HCT VFR BLD CALC: 49 % (ref 37–54)
HEMOGLOBIN: 16.4 G/DL (ref 12.5–16.5)
MCH RBC QN AUTO: 29.8 PG (ref 26–35)
MCHC RBC AUTO-ENTMCNC: 33.5 % (ref 32–34.5)
MCV RBC AUTO: 88.9 FL (ref 80–99.9)
PDW BLD-RTO: 14 FL (ref 11.5–15)
PLATELET # BLD: 150 E9/L (ref 130–450)
PMV BLD AUTO: 10.1 FL (ref 7–12)
POTASSIUM SERPL-SCNC: 4.4 MMOL/L (ref 3.5–5)
RBC # BLD: 5.51 E12/L (ref 3.8–5.8)
SODIUM BLD-SCNC: 141 MMOL/L (ref 132–146)
WBC # BLD: 7.4 E9/L (ref 4.5–11.5)

## 2021-02-05 PROCEDURE — 80048 BASIC METABOLIC PNL TOTAL CA: CPT

## 2021-02-05 PROCEDURE — 99239 HOSP IP/OBS DSCHRG MGMT >30: CPT | Performed by: NURSE PRACTITIONER

## 2021-02-05 PROCEDURE — 6370000000 HC RX 637 (ALT 250 FOR IP): Performed by: NURSE PRACTITIONER

## 2021-02-05 PROCEDURE — 85027 COMPLETE CBC AUTOMATED: CPT

## 2021-02-05 PROCEDURE — 36415 COLL VENOUS BLD VENIPUNCTURE: CPT

## 2021-02-05 RX ORDER — HYDROCHLOROTHIAZIDE 25 MG/1
25 TABLET ORAL DAILY
Status: DISCONTINUED | OUTPATIENT
Start: 2021-02-05 | End: 2021-02-05 | Stop reason: HOSPADM

## 2021-02-05 RX ORDER — LISINOPRIL 40 MG/1
40 TABLET ORAL DAILY
Qty: 30 TABLET | Refills: 0 | Status: SHIPPED | OUTPATIENT
Start: 2021-02-06 | End: 2021-03-08

## 2021-02-05 RX ORDER — ASPIRIN 81 MG/1
81 TABLET, CHEWABLE ORAL DAILY
Qty: 30 TABLET | Refills: 0 | Status: SHIPPED | OUTPATIENT
Start: 2021-02-05

## 2021-02-05 RX ORDER — ATORVASTATIN CALCIUM 40 MG/1
40 TABLET, FILM COATED ORAL NIGHTLY
Qty: 30 TABLET | Refills: 0 | Status: SHIPPED | OUTPATIENT
Start: 2021-02-05

## 2021-02-05 RX ORDER — HYDROCHLOROTHIAZIDE 25 MG/1
25 TABLET ORAL DAILY
Qty: 30 TABLET | Refills: 0 | Status: SHIPPED | OUTPATIENT
Start: 2021-02-06 | End: 2021-03-08

## 2021-02-05 RX ORDER — DULOXETIN HYDROCHLORIDE 20 MG/1
20 CAPSULE, DELAYED RELEASE ORAL 2 TIMES DAILY
Qty: 60 CAPSULE | Refills: 0 | Status: SHIPPED | OUTPATIENT
Start: 2021-02-05 | End: 2021-03-07

## 2021-02-05 RX ORDER — FINASTERIDE 5 MG/1
5 TABLET, FILM COATED ORAL DAILY
Qty: 30 TABLET | Refills: 0 | Status: SHIPPED | OUTPATIENT
Start: 2021-02-05

## 2021-02-05 RX ADMIN — ASPIRIN 81 MG: 81 TABLET, CHEWABLE ORAL at 09:26

## 2021-02-05 RX ADMIN — HYDROCHLOROTHIAZIDE 25 MG: 25 TABLET ORAL at 10:09

## 2021-02-05 RX ADMIN — DULOXETINE HYDROCHLORIDE 20 MG: 20 CAPSULE, DELAYED RELEASE ORAL at 09:26

## 2021-02-05 RX ADMIN — METOPROLOL TARTRATE 25 MG: 25 TABLET, FILM COATED ORAL at 09:26

## 2021-02-05 RX ADMIN — LISINOPRIL 40 MG: 20 TABLET ORAL at 09:26

## 2021-02-05 NOTE — GROUP NOTE
Group Therapy Note    Date: 2/5/2021    Group Start Time: 1005  Group End Time: 4119  Group Topic: Psychoeducation    [facility]        Group Therapy Note    Attendees: 7         Patient's Goal:  Leave here today. Notes: Active and engaged during discussion on building new habits. Able to identify one to apply today. Status After Intervention:  Improved    Participation Level:  Active Listener and Interactive    Participation Quality: Appropriate, Attentive and Sharing      Speech:  normal      Thought Process/Content: Logical      Affective Functioning: Congruent      Mood: euthymic      Level of consciousness:  Alert and Attentive      Response to Learning: Progressing to goal      Endings: None Reported    Modes of Intervention: Education, Support, Socialization and Exploration      Discipline Responsible: Psychoeducational Specialist      Signature:  Tj Sepulveda

## 2021-02-05 NOTE — CARE COORDINATION
In order to ensure appropriate transition and discharge planning is in place, the following documents have been transmitted to [facility], as the new outpatient provider:     · The d/c diagnosis was transmitted to the next care provider  · The reason for hospitalization was transmitted to the next care provider  · The d/c medications (dosage and indication) were transmitted to the next care provider   · The continuing care plan was transmitted to the next care provider

## 2021-02-05 NOTE — PROGRESS NOTES
Hospitalist Progress Note      SYNOPSIS: Patient admitted on 2021 for suicidal ideations. Patient presented to the ED with suicidal ideation beginning 3 days prior to arrival.  He states that he has been depressed since he lost his partner 1 year ago. He does not have a plan and states that he is too afraid to actually kill himself. He was admitted to [facility] and we were asked to see/evaluate the patient for medical management. The patient states that he does see his PCP at the [facility], but he has not taken his medication since his wife  approximately 1 year ago. He is a poor historian, but notes that he has high blood pressure and history of a CABG. Hospital day 3     SUBJECTIVE:  Stable overnight. No issues reported. Patient seen and examined. Doing well, feeling better. No dizziness, chest pain, palpitations. Records reviewed. Temp (24hrs), Av.8 °F (36.6 °C), Min:97.7 °F (36.5 °C), Max:97.9 °F (36.6 °C)    DIET: DIET GENERAL;  CODE: Full Code  No intake or output data in the 24 hours ending 21 0930    OBJECTIVE:    BP (!) 142/68   Pulse 63   Temp 97.7 °F (36.5 °C) (Temporal)   Resp 16   Ht 5' 10\" (1.778 m)   Wt 185 lb (83.9 kg)   SpO2 93%   BMI 26.54 kg/m²     General appearance: Elderly gentleman in no apparent distress, appears stated age and cooperative. HEENT: Normal cephalic, atraumatic without obvious deformity. Pupils equal, round, and reactive to light. Extra ocular muscles intact. Conjunctivae/corneas clear. Neck: Supple, with full range of motion. No jugular venous distention. Trachea midline. Respiratory:  Rhonchi that clears with coughing then CTA throughout. No apparent distress. Cardiovascular:  Regular rate and rhythm. S1, S2 without murmurs, rubs, or gallops. PV: Brisk capillary refill. +2 pedal and radial pulses bilaterally. No clubbing or cyanosis. +1 pitting edema of bilateral lower extremities. Abdomen: Soft, non-tender, non-distended. +BS  Musculoskeletal: No obvious deformities or erythematous or edematous joints. Skin: Normal skin color. No rashes or lesions. Neurologic:  Neurovascularly intact without any focal sensory/motor deficits. Cranial nerves: II-XII intact, grossly non-focal.  Psychiatric: Alert and oriented, thought content appropriate, normal insight    Medications:  REVIEWED DAILY    Infusion Medications   Scheduled Medications    lisinopril  40 mg Oral Daily    atorvastatin  40 mg Oral Nightly    metoprolol tartrate  25 mg Oral BID    aspirin  81 mg Oral Daily    DULoxetine  20 mg Oral BID     PRN Meds: acetaminophen, magnesium hydroxide, aluminum & magnesium hydroxide-simethicone, haloperidol lactate **OR** haloperidol, traZODone    Labs:     Recent Labs     02/02/21  1336 02/05/21  0817   WBC 7.0 7.4   HGB 17.6* 16.4   HCT 55.7* 49.0    150       Recent Labs     02/02/21  1336 02/05/21  0817    141   K 4.1 4.4    104   CO2 29 25   BUN 11 18   CREATININE 1.0 1.0   CALCIUM 9.0 9.0       Recent Labs     02/02/21  1336   PROT 7.0   ALKPHOS 61   ALT 22   AST 18   BILITOT 0.4     Chronic labs:    Lab Results   Component Value Date    CHOL 191 02/03/2021    TRIG 212 (H) 02/03/2021    HDL 44 02/03/2021    LDLCALC 105 (H) 02/03/2021    TSH 1.410 02/03/2021    INR 1.4 04/18/2012    LABA1C 6.0 (H) 02/03/2021       Radiology: REVIEWED DAILY    ASSESSMENT:  Principal Problem:    Major depressive disorder, single episode  Active Problems:    3-vessel CAD    S/P CABG x 3    HTN (hypertension)    Hyperlipidemia    BPH (benign prostatic hyperplasia)    Tobacco abuse    RBBB  Resolved Problems:    * No resolved hospital problems. *    PLAN:    Remains hypertensive despite increasing lisinopril, will add HCTZ   Check BMP OP. Thanks for allowing us to participate in the care of Theadora Schlatter. We will continue to follow along.  Please do not hesitate to contact us if needed.    +++++++++++++++++++++++++++++++++++++++++++++++++  ATA Beck/ Joseph [address]  +++++++++++++++++++++++++++++++++++++++++++++++++  NOTE: This report was transcribed using voice recognition software. Every effort was made to ensure accuracy; however, inadvertent computerized transcription errors may be present.

## 2021-02-05 NOTE — DISCHARGE SUMMARY
of club or organization: Not on file     Attends meetings of clubs or organizations: Not on file     Relationship status: Not on file    Intimate partner violence     Fear of current or ex partner: Not on file     Emotionally abused: Not on file     Physically abused: Not on file     Forced sexual activity: Not on file   Other Topics Concern    Not on file   Social History Narrative    Not on file       MEDICATIONS:    Current Facility-Administered Medications:     hydroCHLOROthiazide (HYDRODIURIL) tablet 25 mg, 25 mg, Oral, Daily, JENNA Stringer - NP, 25 mg at 02/05/21 1009    lisinopril (PRINIVIL;ZESTRIL) tablet 40 mg, 40 mg, Oral, Daily, JENNA Stringer - NP, 40 mg at 02/05/21 8849    atorvastatin (LIPITOR) tablet 40 mg, 40 mg, Oral, Nightly, JENNA Stringer - NP, 40 mg at 02/04/21 2102    metoprolol tartrate (LOPRESSOR) tablet 25 mg, 25 mg, Oral, BID, JENNA Stringer - NP, 25 mg at 02/05/21 8263    aspirin chewable tablet 81 mg, 81 mg, Oral, Daily, JENNA Stringer - NP, 81 mg at 02/05/21 0926    DULoxetine (CYMBALTA) extended release capsule 20 mg, 20 mg, Oral, BID, JENNA Aj - CNP, 20 mg at 02/05/21 4856    acetaminophen (TYLENOL) tablet 650 mg, 650 mg, Oral, Q4H PRN, Dorene Tamez MD, 650 mg at 02/03/21 1419    magnesium hydroxide (MILK OF MAGNESIA) 400 MG/5ML suspension 30 mL, 30 mL, Oral, Daily PRN, Dorene Tamez MD    aluminum & magnesium hydroxide-simethicone (MAALOX) 200-200-20 MG/5ML suspension 30 mL, 30 mL, Oral, PRN, Dorene Tamez MD    haloperidol lactate (HALDOL) injection 3 mg, 3 mg, Intramuscular, Q6H PRN **OR** haloperidol (HALDOL) tablet 3 mg, 3 mg, Oral, Q6H PRN, Dorene Tamez MD    traZODone (DESYREL) tablet 25 mg, 25 mg, Oral, Nightly PRN, Dorene Tamez MD    Examination:  BP (!) 142/68   Pulse 63   Temp 97.7 °F (36.5 °C) (Temporal)   Resp 16   Ht 5' 10\" (1.778 m)   Wt 185 lb (83.9 kg)   SpO2 93%   BMI 26.54 kg/m² Energy:    [x] Normal  [] Increased  [] Decreased     SI [] Present  [x] Absent  HI  []Present  [x] Absent   Aggression:  [] yes  [] no  Patient is [x] able  [] unable to CONTRACT FOR SAFETY   Medication side effects(SE):  [x] None(Psych. Meds.) [] Other      Mental Status Examination on discharge:    Level of consciousness:  within normal limits   Appearance:  well-appearing  Behavior/Motor:  no abnormalities noted  Attitude toward examiner:  attentive and good eye contact  Speech:  spontaneous, normal rate and normal volume   Mood: euthymic  Affect:  mood congruent  Thought processes:  linear and goal directed   Thought content: The patient is devoid of suicidal or homicidal ideation intent or plan. Devoid of auditory or visual hallucinations or other perceptual disturbances, there are no overt or covert signs of psychosis or paranoia. There are no neurovegetative signs of depression. Cognition:  oriented to person, place, and time   Concentration intact  Memory intact  Insight good   Judgement fair   Fund of Knowledge adequate      ASSESSMENT:  Patient symptoms are:  [x] Well controlled  [x] Improving  [] Worsening  [] No change    Reason for more than one antipsychotic:  [x] N/A  [] 3 Failed Monotherapy attempts (Drugs tried:)  [] Crossover to a new antipsychotic  [] Taper to Monotherapy from Polypharmacy  [] Augmentation of clozapine therapy due to treatment resistance to single therapy    Diagnosis:  Principal Problem:    Major depressive disorder, single episode  Active Problems:    3-vessel CAD    S/P CABG x 3    HTN (hypertension)    Hyperlipidemia    BPH (benign prostatic hyperplasia)    Tobacco abuse    RBBB  Resolved Problems:    * No resolved hospital problems.  *      LABS:    Recent Labs     02/02/21  1336 02/05/21  0817   WBC 7.0 7.4   HGB 17.6* 16.4    150     Recent Labs     02/02/21  1336 02/05/21  0817    141   K 4.1 4.4    104   CO2 29 25   BUN 11 18   CREATININE 1.0 1.0   GLUCOSE 103* 103*     Recent Labs     02/02/21  1336   BILITOT 0.4   ALKPHOS 61   AST 18   ALT 22     Lab Results   Component Value Date    LABAMPH NOT DETECTED 02/02/2021    BARBSCNU NOT DETECTED 02/02/2021    LABBENZ NOT DETECTED 02/02/2021    LABMETH NOT DETECTED 02/02/2021    OPIATESCREENURINE NOT DETECTED 02/02/2021    PHENCYCLIDINESCREENURINE NOT DETECTED 02/02/2021    ETOH <10 02/02/2021     Lab Results   Component Value Date    TSH 1.410 02/03/2021     No results found for: LITHIUM  No results found for: VALPROATE, CBMZ    RISK ASSESSMENT AT DISCHARGE: Low risk for suicide and homicide. Treatment Plan:  The plan of care and medications have been reviewed with Dr. Sheba Coello in the collaborative care team.  Reviewed current Medications with the patient. Education provided on the complaince with treatment. Risk, benefit, side effects, possible outcomes of the medication and alternatives discussed with the patient and the patient demonstrated understanding. The patient was also educated that the outcome of treatment will depend on the medication compliance as directed by the prescribers along with regular follow-up, compliance with the labs and other work-up, as clinically indicated. Encourage patient to attend outpatient follow up appointment and therapy. Patient's outpatient appointments have been scheduled prior to discharge. Patient was advised to call the outpatient provider, visit the nearest ED or call 911 if symptoms are not manageable. APS was contacted prior to the discharge, since the patient has no family available to call for collateral, the patient was discharged home in psychiatrically stable condition.          Medication List      START taking these medications    DULoxetine 20 MG extended release capsule  Commonly known as: CYMBALTA  Take 1 capsule by mouth 2 times daily     hydroCHLOROthiazide 25 MG tablet  Commonly known as: HYDRODIURIL  Take 1 tablet by mouth daily  Start taking on: February 6, 2021     lisinopril 40 MG tablet  Commonly known as: PRINIVIL;ZESTRIL  Take 1 tablet by mouth daily  Start taking on: February 6, 2021        Ciro Lopez taking these medications    aspirin 81 MG chewable tablet  Take 1 tablet by mouth daily. finasteride 5 MG tablet  Commonly known as: PROSCAR     Fish Oil Oil     metoprolol tartrate 25 MG tablet  Commonly known as: LOPRESSOR     simvastatin 20 MG tablet  Commonly known as: ZOCOR  Take 1 tablet by mouth nightly.      therapeutic multivitamin-minerals tablet           Where to Get Your Medications      These medications were sent to [facility] [id number]  [address]    Phone: [phone]   · DULoxetine 20 MG extended release capsule  · hydroCHLOROthiazide 25 MG tablet  · lisinopril 40 MG tablet           TIME SPEND - 35 MINUTES TO COMPLETE THE EVALUATION, DISCHARGE SUMMARY, MEDICATION RECONCILIATION AND FOLLOW UP CARE     Signed:  Arnold Sanchez  2/5/2021  12:07 PM

## 2021-02-05 NOTE — PLAN OF CARE
Problem: Falls - Risk of:  Goal: Will remain free from falls  Description: Will remain free from falls  Outcome: Met This Shift     Problem: Altered Mood, Depressive Behavior:  Goal: Able to verbalize and/or display a decrease in depressive symptoms  Description: Able to verbalize and/or display a decrease in depressive symptoms  Outcome: Met This Shift     Problem: Altered Mood, Depressive Behavior:  Goal: Ability to disclose and discuss suicidal ideas will improve  Description: Ability to disclose and discuss suicidal ideas will improve  Outcome: Met This Shift     Pt denies SI HI and AVH. When asking pt about depression and anxiety he stated, I lost my barbara last March. Pt is pleasant, calm and cooperative with staff. Pt is social with peers. Pt taking medications with no issues and eating provided meals. Will continue to monitor and support.

## 2021-02-05 NOTE — SUICIDE SAFETY PLAN
SAFETY PLAN    A suicide Safety Plan is a document that supports someone when they are having thoughts of suicide. Warning Signs that indicate a suicidal crisis may be developing: What (situations, thoughts, feelings, body sensations, behaviors, etc.) do you experience that lets you know you are beginning to think about suicide? 1. N/A  2.   3. Internal Coping Strategies:  What things can I do (relaxation techniques, hobbies, physical activities, etc.) to take my mind off my problems without contacting another person? 1. Play the organ  2. read  3. meditate    People and social settings that provide distraction: Who can I call or where can I go to distract me? 1. Name: good friends  Phone:   2. Name:   Phone:    3. Place:             4. Place:     People whom I can ask for help: Who can I call when I need help - for example, friends, family, clergy, someone else? 1. Name: Same as above                Phone:   2. Name:   Phone:   3. Name:   Phone:     Professionals or [address] [place] [address] I can contact during a crisis: Who can I call for help - for example, my doctor, my psychiatrist, my psychologist, a mental health provider, a suicide hotline? 1. Clinician Name:    Phone:       Clinician Pager or Emergency Contact #:     2. Clinician Name:    Phone:       Clinician Pager or Emergency Contact #:     3. Suicide Prevention Lifeline: 8-916-080-TALK (8473)    4. [address] [place] [address] Emergency Services -  for example, [facility] suicide hotline, [facility] Hotline:       Emergency Services Address:       Emergency Services Phone:     Making the environment safe: How can I make my environment (house/apartment/living space) safer? For example, can I remove guns, medications, and other items? 1. My home is safe  2.

## 2021-02-06 NOTE — PROGRESS NOTES
[facility]  Discharge Note    Pt discharged with followings belongings:   Dentures: None  Vision - Corrective Lenses: Glasses  Hearing Aid: None  Jewelry: Ring  Body Piercings Removed: N/A  Clothing: Jacket / coat, Shirt, Other (Comment)(hat, shirt, jacket)  Were All Patient Medications Collected?: Not Applicable  Other Valuables: Cell phone, Ra Sinks, Other (Comment)(cellphone, key, 2 pocket books, treasury check)   Valuables retrieved from unit locker and returned to patient. Patient education on aftercare instructions: yes Patient verbalize understanding of AVS:  yes.     Status EXAM upon discharge:  Status and Exam  Normal: No  Facial Expression: Flat  Affect: Congruent  Level of Consciousness: Alert  Mood:Normal: No  Mood: Depressed  Motor Activity:Normal: Yes  Interview Behavior: Cooperative  Preception: Cranks to Person, Natalie [place] to Time, Cranks to Place, Cranks to Situation  Attention:Normal: No  Attention: Distractible  Thought Processes: Circumstantial  Thought Content:Normal: No  Thought Content: Preoccupations  Hallucinations: None  Delusions: No  Memory:Normal: No  Memory: Poor Remote, Poor Recent  Insight and Judgment: No  Insight and Judgment: Poor Judgment, Poor Insight  Present Suicidal Ideation: No  Present Homicidal Ideation: No      Metabolic Screening:    Lab Results   Component Value Date    LABA1C 6.0 (H) 02/03/2021       Lab Results   Component Value Date    CHOL 191 02/03/2021    CHOL 198 04/18/2012    CHOL 23 04/16/2012     Lab Results   Component Value Date    TRIG 212 (H) 02/03/2021    TRIG 112 04/18/2012    TRIG 72 04/16/2012     Lab Results   Component Value Date    HDL 44 02/03/2021    HDL 49.0 04/18/2012    HDL 65.8 04/16/2012     No components found for: [facility] [place]  Lab Results   Component Value Date    LABVLDL 42 02/03/2021       Joshua Samuels RN

## 2022-01-01 ENCOUNTER — HOSPITAL ENCOUNTER (EMERGENCY)
Age: 87
Discharge: HOME | End: 2022-05-30
Attending: EMERGENCY MEDICINE
Payer: MEDICARE

## 2022-01-01 ENCOUNTER — APPOINTMENT (OUTPATIENT)
Dept: CT IMAGING | Age: 87
End: 2022-01-01
Payer: MEDICARE

## 2022-01-01 ENCOUNTER — HOSPITAL ENCOUNTER (EMERGENCY)
Age: 87
Discharge: HOME | End: 2022-06-01
Attending: STUDENT IN AN ORGANIZED HEALTH CARE EDUCATION/TRAINING PROGRAM
Payer: MEDICARE

## 2022-01-01 VITALS
SYSTOLIC BLOOD PRESSURE: 172 MMHG | OXYGEN SATURATION: 97 % | HEART RATE: 73 BPM | WEIGHT: 135 LBS | TEMPERATURE: 99.1 F | DIASTOLIC BLOOD PRESSURE: 88 MMHG | BODY MASS INDEX: 22.49 KG/M2 | RESPIRATION RATE: 18 BRPM | HEIGHT: 65 IN

## 2022-01-01 VITALS
OXYGEN SATURATION: 96 % | HEIGHT: 72 IN | RESPIRATION RATE: 14 BRPM | BODY MASS INDEX: 21.67 KG/M2 | SYSTOLIC BLOOD PRESSURE: 130 MMHG | HEART RATE: 82 BPM | TEMPERATURE: 97 F | WEIGHT: 160 LBS | DIASTOLIC BLOOD PRESSURE: 77 MMHG

## 2022-01-01 DIAGNOSIS — R31.9 URINARY TRACT INFECTION WITH HEMATURIA, SITE UNSPECIFIED: ICD-10-CM

## 2022-01-01 DIAGNOSIS — R31.9 HEMATURIA, UNSPECIFIED TYPE: Primary | ICD-10-CM

## 2022-01-01 DIAGNOSIS — Z97.8 FOLEY CATHETER IN PLACE: ICD-10-CM

## 2022-01-01 DIAGNOSIS — N39.0 URINARY TRACT INFECTION WITH HEMATURIA, SITE UNSPECIFIED: ICD-10-CM

## 2022-01-01 DIAGNOSIS — K62.5 RECTAL BLEEDING: ICD-10-CM

## 2022-01-01 LAB
ABO/RH: NORMAL
ALBUMIN SERPL-MCNC: 3.3 G/DL (ref 3.5–5.2)
ALBUMIN SERPL-MCNC: 3.8 G/DL (ref 3.5–5.2)
ALP BLD-CCNC: 48 U/L (ref 40–129)
ALP BLD-CCNC: 58 U/L (ref 40–129)
ALT SERPL-CCNC: 32 U/L (ref 0–40)
ALT SERPL-CCNC: 36 U/L (ref 0–40)
ANION GAP SERPL CALCULATED.3IONS-SCNC: 11 MMOL/L (ref 7–16)
ANION GAP SERPL CALCULATED.3IONS-SCNC: 15 MMOL/L (ref 7–16)
ANTIBODY SCREEN: NORMAL
AST SERPL-CCNC: 27 U/L (ref 0–39)
AST SERPL-CCNC: 31 U/L (ref 0–39)
BACTERIA: ABNORMAL /HPF
BACTERIA: ABNORMAL /HPF
BASOPHILS ABSOLUTE: 0.01 E9/L (ref 0–0.2)
BASOPHILS ABSOLUTE: 0.02 E9/L (ref 0–0.2)
BASOPHILS RELATIVE PERCENT: 0.1 % (ref 0–2)
BASOPHILS RELATIVE PERCENT: 0.2 % (ref 0–2)
BILIRUB SERPL-MCNC: 0.5 MG/DL (ref 0–1.2)
BILIRUB SERPL-MCNC: 0.7 MG/DL (ref 0–1.2)
BILIRUBIN URINE: ABNORMAL
BILIRUBIN URINE: ABNORMAL
BLOOD, URINE: ABNORMAL
BLOOD, URINE: ABNORMAL
BUN BLDV-MCNC: 25 MG/DL (ref 6–23)
BUN BLDV-MCNC: 26 MG/DL (ref 6–23)
CALCIUM SERPL-MCNC: 8.1 MG/DL (ref 8.6–10.2)
CALCIUM SERPL-MCNC: 8.8 MG/DL (ref 8.6–10.2)
CHLORIDE BLD-SCNC: 100 MMOL/L (ref 98–107)
CHLORIDE BLD-SCNC: 104 MMOL/L (ref 98–107)
CLARITY: ABNORMAL
CLARITY: ABNORMAL
CO2: 20 MMOL/L (ref 22–29)
CO2: 22 MMOL/L (ref 22–29)
COLOR: ABNORMAL
COLOR: ABNORMAL
CREAT SERPL-MCNC: 1.1 MG/DL (ref 0.7–1.2)
CREAT SERPL-MCNC: 1.2 MG/DL (ref 0.7–1.2)
EKG ATRIAL RATE: 73 BPM
EKG P AXIS: 38 DEGREES
EKG P-R INTERVAL: 188 MS
EKG Q-T INTERVAL: 450 MS
EKG QRS DURATION: 110 MS
EKG QTC CALCULATION (BAZETT): 495 MS
EKG R AXIS: -49 DEGREES
EKG T AXIS: -17 DEGREES
EKG VENTRICULAR RATE: 73 BPM
EOSINOPHILS ABSOLUTE: 0.02 E9/L (ref 0.05–0.5)
EOSINOPHILS ABSOLUTE: 0.02 E9/L (ref 0.05–0.5)
EOSINOPHILS RELATIVE PERCENT: 0.2 % (ref 0–6)
EOSINOPHILS RELATIVE PERCENT: 0.2 % (ref 0–6)
GFR AFRICAN AMERICAN: >60
GFR AFRICAN AMERICAN: >60
GFR NON-AFRICAN AMERICAN: 57 ML/MIN/1.73
GFR NON-AFRICAN AMERICAN: >60 ML/MIN/1.73
GLUCOSE BLD-MCNC: 103 MG/DL (ref 74–99)
GLUCOSE BLD-MCNC: 103 MG/DL (ref 74–99)
GLUCOSE URINE: NEGATIVE MG/DL
GLUCOSE URINE: NEGATIVE MG/DL
HCT VFR BLD CALC: 44.4 % (ref 37–54)
HCT VFR BLD CALC: 47.7 % (ref 37–54)
HCT VFR BLD CALC: 51.1 % (ref 37–54)
HEMOGLOBIN: 14.5 G/DL (ref 12.5–16.5)
HEMOGLOBIN: 15.3 G/DL (ref 12.5–16.5)
HEMOGLOBIN: 16.7 G/DL (ref 12.5–16.5)
IMMATURE GRANULOCYTES #: 0.05 E9/L
IMMATURE GRANULOCYTES #: 0.07 E9/L
IMMATURE GRANULOCYTES %: 0.6 % (ref 0–5)
IMMATURE GRANULOCYTES %: 0.6 % (ref 0–5)
KETONES, URINE: 15 MG/DL
KETONES, URINE: 40 MG/DL
LACTIC ACID: 1.8 MMOL/L (ref 0.5–2.2)
LEUKOCYTE ESTERASE, URINE: ABNORMAL
LEUKOCYTE ESTERASE, URINE: NEGATIVE
LYMPHOCYTES ABSOLUTE: 1.18 E9/L (ref 1.5–4)
LYMPHOCYTES ABSOLUTE: 1.38 E9/L (ref 1.5–4)
LYMPHOCYTES RELATIVE PERCENT: 10.9 % (ref 20–42)
LYMPHOCYTES RELATIVE PERCENT: 13 % (ref 20–42)
MCH RBC QN AUTO: 28.6 PG (ref 26–35)
MCH RBC QN AUTO: 29 PG (ref 26–35)
MCHC RBC AUTO-ENTMCNC: 32.7 % (ref 32–34.5)
MCHC RBC AUTO-ENTMCNC: 32.7 % (ref 32–34.5)
MCV RBC AUTO: 87.5 FL (ref 80–99.9)
MCV RBC AUTO: 88.8 FL (ref 80–99.9)
MONOCYTES ABSOLUTE: 0.44 E9/L (ref 0.1–0.95)
MONOCYTES ABSOLUTE: 1 E9/L (ref 0.1–0.95)
MONOCYTES RELATIVE PERCENT: 4.9 % (ref 2–12)
MONOCYTES RELATIVE PERCENT: 7.9 % (ref 2–12)
NEUTROPHILS ABSOLUTE: 10.19 E9/L (ref 1.8–7.3)
NEUTROPHILS ABSOLUTE: 7.35 E9/L (ref 1.8–7.3)
NEUTROPHILS RELATIVE PERCENT: 80.2 % (ref 43–80)
NEUTROPHILS RELATIVE PERCENT: 81.2 % (ref 43–80)
NITRITE, URINE: NEGATIVE
NITRITE, URINE: POSITIVE
PDW BLD-RTO: 13.2 FL (ref 11.5–15)
PDW BLD-RTO: 13.9 FL (ref 11.5–15)
PH UA: 6 (ref 5–9)
PH UA: 6.5 (ref 5–9)
PLATELET # BLD: 156 E9/L (ref 130–450)
PLATELET # BLD: 170 E9/L (ref 130–450)
PMV BLD AUTO: 10.3 FL (ref 7–12)
PMV BLD AUTO: 10.4 FL (ref 7–12)
POTASSIUM REFLEX MAGNESIUM: 4.5 MMOL/L (ref 3.5–5)
POTASSIUM REFLEX MAGNESIUM: 4.8 MMOL/L (ref 3.5–5)
PROTEIN UA: 30 MG/DL
PROTEIN UA: >=300 MG/DL
RBC # BLD: 5 E12/L (ref 3.8–5.8)
RBC # BLD: 5.84 E12/L (ref 3.8–5.8)
RBC UA: >20 /HPF (ref 0–2)
RBC UA: ABNORMAL /HPF (ref 0–2)
SODIUM BLD-SCNC: 135 MMOL/L (ref 132–146)
SODIUM BLD-SCNC: 137 MMOL/L (ref 132–146)
SPECIFIC GRAVITY UA: 1.02 (ref 1–1.03)
SPECIFIC GRAVITY UA: >=1.03 (ref 1–1.03)
TOTAL PROTEIN: 6 G/DL (ref 6.4–8.3)
TOTAL PROTEIN: 6.4 G/DL (ref 6.4–8.3)
URINE CULTURE, ROUTINE: NORMAL
URINE CULTURE, ROUTINE: NORMAL
UROBILINOGEN, URINE: 1 E.U./DL
UROBILINOGEN, URINE: 2 E.U./DL
WBC # BLD: 12.7 E9/L (ref 4.5–11.5)
WBC # BLD: 9.1 E9/L (ref 4.5–11.5)
WBC UA: ABNORMAL /HPF (ref 0–5)
WBC UA: ABNORMAL /HPF (ref 0–5)

## 2022-01-01 PROCEDURE — 99283 EMERGENCY DEPT VISIT LOW MDM: CPT

## 2022-01-01 PROCEDURE — 93005 ELECTROCARDIOGRAM TRACING: CPT

## 2022-01-01 PROCEDURE — 87088 URINE BACTERIA CULTURE: CPT

## 2022-01-01 PROCEDURE — 51702 INSERT TEMP BLADDER CATH: CPT

## 2022-01-01 PROCEDURE — 99284 EMERGENCY DEPT VISIT MOD MDM: CPT

## 2022-01-01 PROCEDURE — 6370000000 HC RX 637 (ALT 250 FOR IP): Performed by: STUDENT IN AN ORGANIZED HEALTH CARE EDUCATION/TRAINING PROGRAM

## 2022-01-01 PROCEDURE — 86901 BLOOD TYPING SEROLOGIC RH(D): CPT

## 2022-01-01 PROCEDURE — 86850 RBC ANTIBODY SCREEN: CPT

## 2022-01-01 PROCEDURE — 81001 URINALYSIS AUTO W/SCOPE: CPT

## 2022-01-01 PROCEDURE — 80053 COMPREHEN METABOLIC PANEL: CPT

## 2022-01-01 PROCEDURE — 86900 BLOOD TYPING SEROLOGIC ABO: CPT

## 2022-01-01 PROCEDURE — 85025 COMPLETE CBC W/AUTO DIFF WBC: CPT

## 2022-01-01 PROCEDURE — 36415 COLL VENOUS BLD VENIPUNCTURE: CPT

## 2022-01-01 PROCEDURE — 83605 ASSAY OF LACTIC ACID: CPT

## 2022-01-01 PROCEDURE — 85018 HEMOGLOBIN: CPT

## 2022-01-01 PROCEDURE — 85014 HEMATOCRIT: CPT

## 2022-01-01 PROCEDURE — 74176 CT ABD & PELVIS W/O CONTRAST: CPT

## 2022-01-01 RX ORDER — CEFDINIR 300 MG/1
300 CAPSULE ORAL ONCE
Status: COMPLETED | OUTPATIENT
Start: 2022-01-01 | End: 2022-01-01

## 2022-01-01 RX ORDER — CEFDINIR 300 MG/1
300 CAPSULE ORAL 2 TIMES DAILY
Qty: 20 CAPSULE | Refills: 0 | Status: SHIPPED | OUTPATIENT
Start: 2022-01-01 | End: 2022-01-01

## 2022-01-01 RX ADMIN — CEFDINIR 300 MG: 300 CAPSULE ORAL at 22:51

## 2022-01-01 ASSESSMENT — ENCOUNTER SYMPTOMS
BLOOD IN STOOL: 1
SORE THROAT: 0
NAUSEA: 0
EYE REDNESS: 0
COUGH: 0
VOMITING: 0
CONSTIPATION: 0
FACIAL SWELLING: 0
BACK PAIN: 0
ABDOMINAL PAIN: 0
RHINORRHEA: 0
EYE PAIN: 0
SHORTNESS OF BREATH: 0
TROUBLE SWALLOWING: 0
EYE ITCHING: 0
DIARRHEA: 0

## 2022-01-01 ASSESSMENT — PAIN - FUNCTIONAL ASSESSMENT
PAIN_FUNCTIONAL_ASSESSMENT: WONG-BAKER FACES
PAIN_FUNCTIONAL_ASSESSMENT: NONE - DENIES PAIN

## 2022-01-01 ASSESSMENT — PAIN SCALES - WONG BAKER: WONGBAKER_NUMERICALRESPONSE: 0

## 2022-05-22 ENCOUNTER — HOSPITAL ENCOUNTER (INPATIENT)
Dept: HOSPITAL 83 - 3N | Age: 87
LOS: 5 days | Discharge: NURSING, REHAB OR LONG-TERM CARE | DRG: 881 | End: 2022-05-27
Attending: PSYCHIATRY & NEUROLOGY | Admitting: PSYCHIATRY & NEUROLOGY
Payer: COMMERCIAL

## 2022-05-22 VITALS — HEIGHT: 66.5 IN | BODY MASS INDEX: 27.16 KG/M2 | WEIGHT: 171 LBS

## 2022-05-22 VITALS — SYSTOLIC BLOOD PRESSURE: 164 MMHG | DIASTOLIC BLOOD PRESSURE: 83 MMHG

## 2022-05-22 VITALS — SYSTOLIC BLOOD PRESSURE: 142 MMHG | DIASTOLIC BLOOD PRESSURE: 58 MMHG

## 2022-05-22 DIAGNOSIS — F02.80: ICD-10-CM

## 2022-05-22 DIAGNOSIS — R73.03: ICD-10-CM

## 2022-05-22 DIAGNOSIS — Z66: ICD-10-CM

## 2022-05-22 DIAGNOSIS — E53.8: ICD-10-CM

## 2022-05-22 DIAGNOSIS — E44.0: ICD-10-CM

## 2022-05-22 DIAGNOSIS — F32.9: Primary | ICD-10-CM

## 2022-05-22 DIAGNOSIS — Z51.5: ICD-10-CM

## 2022-05-22 DIAGNOSIS — N17.0: ICD-10-CM

## 2022-05-22 DIAGNOSIS — F41.9: ICD-10-CM

## 2022-05-22 DIAGNOSIS — G30.9: ICD-10-CM

## 2022-05-22 DIAGNOSIS — J69.0: ICD-10-CM

## 2022-05-22 DIAGNOSIS — I25.10: ICD-10-CM

## 2022-05-22 DIAGNOSIS — F63.9: ICD-10-CM

## 2022-05-22 DIAGNOSIS — I10: ICD-10-CM

## 2022-05-22 DIAGNOSIS — R73.9: ICD-10-CM

## 2022-05-22 DIAGNOSIS — E87.8: ICD-10-CM

## 2022-05-22 DIAGNOSIS — F32.A: ICD-10-CM

## 2022-05-22 DIAGNOSIS — I95.9: ICD-10-CM

## 2022-05-23 VITALS — DIASTOLIC BLOOD PRESSURE: 72 MMHG | SYSTOLIC BLOOD PRESSURE: 148 MMHG

## 2022-05-23 VITALS — DIASTOLIC BLOOD PRESSURE: 90 MMHG

## 2022-05-23 LAB
25(OH)D3 SERPL-MCNC: 45.4 NG/ML (ref 30–100)
ALP SERPL-CCNC: 46 U/L (ref 45–117)
ALT SERPL W P-5'-P-CCNC: 27 U/L (ref 12–78)
AST SERPL-CCNC: 13 IU/L (ref 3–35)
BASOPHILS # BLD AUTO: 0 10*3/UL (ref 0–0.1)
BASOPHILS NFR BLD AUTO: 0.2 % (ref 0–1)
BUN SERPL-MCNC: 14 MG/DL (ref 7–24)
CHLORIDE SERPL-SCNC: 108 MMOL/L (ref 98–107)
CHOLEST SERPL-MCNC: 105 MG/DL (ref ?–200)
CREAT SERPL-MCNC: 0.98 MG/DL (ref 0.7–1.3)
EOSINOPHIL # BLD AUTO: 0.1 10*3/UL (ref 0–0.4)
EOSINOPHIL # BLD AUTO: 2.1 % (ref 1–4)
ERYTHROCYTE [DISTWIDTH] IN BLOOD BY AUTOMATED COUNT: 13.2 % (ref 0–14.5)
HCT VFR BLD AUTO: 44.2 % (ref 42–52)
LDLC SERPL DIRECT ASSAY-MCNC: 57 MG/DL (ref 9–159)
LYMPHOCYTES # BLD AUTO: 1.6 10*3/UL (ref 1.3–4.4)
LYMPHOCYTES NFR BLD AUTO: 24.3 % (ref 27–41)
MCH RBC QN AUTO: 28.8 PG (ref 27–31)
MCHC RBC AUTO-ENTMCNC: 33.3 G/DL (ref 33–37)
MCV RBC AUTO: 86.7 FL (ref 80–94)
MONOCYTES # BLD AUTO: 0.5 10*3/UL (ref 0.1–1)
MONOCYTES NFR BLD MANUAL: 8.2 % (ref 3–9)
NEUT #: 4.3 10*3/UL (ref 2.3–7.9)
NEUT %: 64.7 % (ref 47–73)
NRBC BLD QL AUTO: 0 % (ref 0–0)
PLATELET # BLD AUTO: 140 10*3/UL (ref 130–400)
PMV BLD AUTO: 10.5 FL (ref 9.6–12.3)
POTASSIUM SERPL-SCNC: 4.1 MMOL/L (ref 3.5–5.1)
PROT SERPL-MCNC: 5.7 GM/DL (ref 6.4–8.2)
RBC # BLD AUTO: 5.1 10*6/UL (ref 4.5–5.9)
SODIUM SERPL-SCNC: 139 MMOL/L (ref 136–145)
TRIGL SERPL-MCNC: 59 MG/DL (ref ?–150)
TSH SERPL DL<=0.005 MIU/L-ACNC: 0.52 UIU/ML (ref 0.36–4.75)
WBC NRBC COR # BLD AUTO: 6.6 10*3/UL (ref 4.8–10.8)

## 2022-05-24 VITALS — SYSTOLIC BLOOD PRESSURE: 149 MMHG | DIASTOLIC BLOOD PRESSURE: 86 MMHG

## 2022-05-24 VITALS — DIASTOLIC BLOOD PRESSURE: 83 MMHG | SYSTOLIC BLOOD PRESSURE: 139 MMHG

## 2022-05-25 VITALS — DIASTOLIC BLOOD PRESSURE: 86 MMHG

## 2022-05-25 VITALS — SYSTOLIC BLOOD PRESSURE: 142 MMHG | DIASTOLIC BLOOD PRESSURE: 80 MMHG

## 2022-05-26 VITALS — DIASTOLIC BLOOD PRESSURE: 82 MMHG

## 2022-05-26 VITALS — DIASTOLIC BLOOD PRESSURE: 86 MMHG

## 2022-05-27 VITALS — DIASTOLIC BLOOD PRESSURE: 84 MMHG | SYSTOLIC BLOOD PRESSURE: 142 MMHG

## 2022-05-30 NOTE — ED NOTES
Pt incont of stool. Aleta care provided-smear of blood noted.       Kiran Mitchell RN  05/29/22 6296

## 2022-05-30 NOTE — ED PROVIDER NOTES
Name: Diony Porras   MRN: [id number]     --------------------------------------------- History of Present Illness ---------------------------------------------  5/29/22, Time: 9:10 PM EDT   Chief Complaint   Patient presents with    Rectal Bleeding     3 hours per nurse at [address] is a 80 y.o. male, with hx of HTN, CAD, CABG, BPH, who presents to the ED today for blood in stool per [facility] facility. Patient is alert, not oriented to year however answers other questions appropriately. Patient denies any symptoms. Denies any complaints. Patient states he feels fine. The pt denies any associated cough, fatigue, fever, lightheadedness, dizziness, HA, n/v, chest pain, shortness of breath, abd pain, GI or  complaints. Allg: Patient has no known allergies. PCP: No primary care provider on file. .    Meds: No current facility-administered medications for this encounter. Current Outpatient Medications:     DULoxetine (CYMBALTA) 20 MG extended release capsule, Take 1 capsule by mouth 2 times daily, Disp: 60 capsule, Rfl: 0    lisinopril (PRINIVIL;ZESTRIL) 40 MG tablet, Take 1 tablet by mouth daily, Disp: 30 tablet, Rfl: 0    hydroCHLOROthiazide (HYDRODIURIL) 25 MG tablet, Take 1 tablet by mouth daily, Disp: 30 tablet, Rfl: 0    metoprolol tartrate (LOPRESSOR) 25 MG tablet, Take 1 tablet by mouth 2 times daily, Disp: 60 tablet, Rfl: 0    atorvastatin (LIPITOR) 40 MG tablet, Take 1 tablet by mouth nightly, Disp: 30 tablet, Rfl: 0    aspirin 81 MG chewable tablet, Take 1 tablet by mouth daily, Disp: 30 tablet, Rfl: 0    finasteride (PROSCAR) 5 MG tablet, Take 1 tablet by mouth daily, Disp: 30 tablet, Rfl: 0    Fish Oil OIL, Take  by mouth daily. , Disp: , Rfl:     therapeutic multivitamin-minerals (THERAGRAN-M) tablet, Take 1 tablet by mouth daily. , Disp: , Rfl:      Review of Systems   Constitutional: Negative for chills, fatigue and fever.    HENT: Negative for congestion, facial swelling, rhinorrhea, sore throat and trouble swallowing. Eyes: Negative for pain, redness and itching. Respiratory: Negative for cough and shortness of breath. Cardiovascular: Negative for chest pain and palpitations. Gastrointestinal: Positive for blood in stool (per living facility). Negative for abdominal pain, constipation, diarrhea, nausea and vomiting. Endocrine: Negative for polyuria. Genitourinary: Negative for difficulty urinating, dysuria, flank pain and hematuria. Musculoskeletal: Negative for arthralgias, back pain, myalgias and neck pain. Skin: Negative for pallor, rash and wound. Neurological: Negative for dizziness, syncope, weakness, numbness and headaches. Psychiatric/Behavioral: Negative for agitation, behavioral problems and confusion. The patient is not nervous/anxious. Physical Exam  Vitals and nursing note reviewed. Constitutional:       General: He is not in acute distress. Appearance: Normal appearance. He is not ill-appearing, toxic-appearing or diaphoretic. HENT:      Head: Normocephalic and atraumatic. Right Ear: External ear normal.      Left Ear: External ear normal.      Nose: Nose normal.      Mouth/Throat:      Mouth: Mucous membranes are moist.      Pharynx: Oropharynx is clear. Eyes:      General: No scleral icterus. Right eye: No discharge. Left eye: No discharge. Pupils: Pupils are equal, round, and reactive to light. Cardiovascular:      Rate and Rhythm: Normal rate and regular rhythm. Pulses: Normal pulses. Pulmonary:      Effort: Pulmonary effort is normal. No respiratory distress. Breath sounds: Normal breath sounds. Abdominal:      General: There is no distension. Palpations: Abdomen is soft. Tenderness: There is no abdominal tenderness. There is no guarding or rebound. Genitourinary:     Rectum: Guaiac result positive.    Musculoskeletal:         General: No tenderness or deformity. Normal range of motion. Cervical back: Normal range of motion. No tenderness. Right lower leg: No edema. Left lower leg: No edema. Skin:     General: Skin is warm and dry. Capillary Refill: Capillary refill takes less than 2 seconds. Coloration: Skin is not jaundiced or pale. Findings: No bruising, erythema, lesion or rash. Neurological:      General: No focal deficit present. Mental Status: He is alert and oriented to person, place, and time. Cranial Nerves: No cranial nerve deficit. Motor: No weakness. Psychiatric:         Mood and Affect: Mood normal.         Behavior: Behavior normal.          Procedures     MDM  Number of Diagnoses or Management Options  Trent catheter in place  Hematuria, unspecified type  Rectal bleeding  Diagnosis management comments: Mr. Mat Ferreira is an 81 y/o M pt who presents today from living facility for blood in stool. Pt A&Ox2, patient denies any complaints. Blood in stool - FOBT positive, no gross blood appreciated, no hemorrhoids appreciated. Hemoglobin stable 16.7. Lab work rather unremarkable. Repeat H/H stable. Hematuria - Straight cath used for UA and hematuria was appreciated. Trent catheter placed. UA negative for UTI. Pt instructed to f/u with Urology for further eval.     Pt signed out to Dr. Duarte Garcia. Pt remained stable throughout visit and was discharged home        ED Course as of 05/30/22 0340   Sun May 29, 2022   2312 Hemoglobin Quant(!): 16.7 [PW]   2321 FOBT positive. [PW]   Mon May 30, 2022   0038 Nurse straight cathed patient for urine sample and had large blood in urine.   [PW]      ED Course User Index  [PW] Jayson Zarate DO          --------------------------------------------- PAST HISTORY ---------------------------------------------  Past Medical History:  has a past medical history of BPH (benign prostatic hyperplasia), CAD (coronary artery disease), Diverticulosis, Hyperlipidemia, 05/30/22 0340   Sun May 29, 2022   2312 Hemoglobin Quant(!): 16.7 [PW]   2321 FOBT positive. [PW]   Mon May 30, 2022   0038 Nurse straight cathed patient for urine sample and had large blood in urine. [PW]      ED Course User Index  [PW] Tanya Huggins DO        Medications - No data to display    ------------------------------------------ PROGRESS NOTES ------------------------------------------  3:39 AM EDT  I have spoken with the patient and discussed todays results, in addition to providing specific details for the plan of care and counseling regarding the diagnosis and prognosis. Their questions are answered at this time and they are agreeable with the plan. I discussed at length with them reasons for immediate return here for re evaluation. They will followup with their PCP by calling their office tomorrow and were instructed to return to the ED for any new or worsening symptoms. --------------------------------- ADDITIONAL PROVIDER NOTES ---------------------------------  At this time the patient is without objective evidence of an acute process requiring hospitalization or inpatient management. They have remained hemodynamically stable throughout their entire ED visit and are stable for discharge with outpatient follow-up. The plan has been discussed in detail and they are aware of the specific conditions for emergent return, as well as the importance of follow-up. New Prescriptions    No medications on file       Diagnosis:  1. Hematuria, unspecified type    2. Rectal bleeding    3. Trent catheter in place        Disposition:  Patient's disposition: Discharge to home  Patient's condition is stable. Tanya Huggins DO       *NOTE: This report was transcribed using voice recognition software. Every effort was made to ensure accuracy; however, inadvertent computerized transcription errors may be present.        Tanya Huggins DO  Resident  05/30/22 1118

## 2022-05-30 NOTE — ED NOTES
Call placed to [facility] due to time laps with transportation back to .home facility. [facility] states that it should be within the hour.      Uriel Mills RN  05/30/22 3011

## 2022-05-31 NOTE — CARE COORDINATION
Social Work /Transition of Care:    Pt presented to the ED secondary to bleeding from mann catheter. Pt is from [address] [place] [address]. Pt is a DNRCC. Per report from facility, pt has an unpaid bill at [facility] office and was unable to go there so he was sent to the ED. Pt has a legal guardian through [facility] who is aware pt was sent to ED and gave consent to treat. SW to follow.

## 2022-05-31 NOTE — ED PROVIDER NOTES
Karely Medeiros is an 80year old male with PMH HLD, HTN who present emergency department concern for hematuria. Patient has a chronic indwelling Trent catheter due to enlarged prostate. Patient has had gross hematuria for 1 day. Unknown when Trent catheter was changed last.  Patient is at his baseline mental status. Patient is a DNR CC per records. Patient is on any anticoagulation. The history is provided by the patient and medical records. Review of Systems   Genitourinary: Positive for hematuria. Physical Exam  Vitals and nursing note reviewed. Constitutional:       General: He is not in acute distress. Appearance: He is ill-appearing. Comments: Chronically ill appearance   HENT:      Head: Normocephalic and atraumatic. Eyes:      General: No scleral icterus. Conjunctiva/sclera: Conjunctivae normal.      Pupils: Pupils are equal, round, and reactive to light. Cardiovascular:      Rate and Rhythm: Normal rate and regular rhythm. Pulmonary:      Effort: Pulmonary effort is normal.      Breath sounds: Normal breath sounds. Abdominal:      General: Bowel sounds are normal. There is no distension. Palpations: Abdomen is soft. Tenderness: There is no abdominal tenderness. There is no guarding or rebound. Comments: Trent in place with gross hematruia   Musculoskeletal:      Cervical back: Normal range of motion and neck supple. No rigidity. No muscular tenderness. Right lower leg: No edema. Left lower leg: No edema. Skin:     General: Skin is warm and dry. Capillary Refill: Capillary refill takes less than 2 seconds. Coloration: Skin is not pale. Findings: No erythema or rash. Neurological:      Mental Status: He is alert and oriented to person, place, and time. Mental status is at baseline.       Comments: A/o x1 at baseline   Psychiatric:         Mood and Affect: Mood normal.          Procedures     MDM  Number of Diagnoses or Management Options  Hematuria, unspecified type  Urinary tract infection with hematuria, site unspecified  Diagnosis management comments: Alexandra Eastman is an 80year old male who presented to ED with hematuria  Mann was flushed and was blood tinged at time of re-evalaution  CT scan showed malposition of mann   Mann was replaced and patient was not having any pain at re-evaluation  Patient was started on abx for possible UTI culture pending  Patient reported to be at his baseline and will be discharged back to facility hgb and renal function stable              --------------------------------------------- PAST HISTORY ---------------------------------------------  Past Medical History:  has a past medical history of BPH (benign prostatic hyperplasia), CAD (coronary artery disease), Diverticulosis, Hyperlipidemia, Hypertension, MI (myocardial infarction) ([facility] [address] RBBB, and Tobacco abuse. Past Surgical History:  has a past surgical history that includes Neck surgery; Cardiac catheterization (4-); Coronary artery bypass graft (4/18/12); Diagnostic Cardiac Cath Lab Procedure; and Colonoscopy (2013). Social History:  reports that he has never smoked. He has never used smokeless tobacco. He reports that he does not drink alcohol and does not use drugs. Family History: family history includes Heart Attack in his mother; Heart Disease in his mother. The patients home medications have been reviewed. Allergies: Patient has no known allergies.     -------------------------------------------------- RESULTS -------------------------------------------------  Labs:  Results for orders placed or performed during the hospital encounter of 05/31/22   Culture, Urine    Specimen: Urine, clean catch   Result Value Ref Range    Urine Culture, Routine Growth not present, incubation continues    Urinalysis with Microscopic   Result Value Ref Range    Color, UA TEO (A) Straw/Yellow    Clarity, UA SL CLOUDY Clear Glucose, Ur Negative Negative mg/dL    Bilirubin Urine MODERATE (A) Negative    Ketones, Urine 15 (A) Negative mg/dL    Specific Gravity, UA 1.025 1.005 - 1.030    Blood, Urine LARGE (A) Negative    pH, UA 6.5 5.0 - 9.0    Protein, UA >=300 (A) Negative mg/dL    Urobilinogen, Urine 2.0 (A) <2.0 E.U./dL    Nitrite, Urine POSITIVE (A) Negative    Leukocyte Esterase, Urine TRACE (A) Negative    WBC, UA NONE 0 - 5 /HPF    RBC, UA PACKED 0 - 2 /HPF    Bacteria, UA MODERATE (A) None Seen /HPF   CBC with Auto Differential   Result Value Ref Range    WBC 12.7 (H) 4.5 - 11.5 E9/L    RBC 5.00 3.80 - 5.80 E12/L    Hemoglobin 14.5 12.5 - 16.5 g/dL    Hematocrit 44.4 37.0 - 54.0 %    MCV 88.8 80.0 - 99.9 fL    MCH 29.0 26.0 - 35.0 pg    MCHC 32.7 32.0 - 34.5 %    RDW 13.9 11.5 - 15.0 fL    Platelets 612 347 - 656 E9/L    MPV 10.4 7.0 - 12.0 fL    Neutrophils % 80.2 (H) 43.0 - 80.0 %    Immature Granulocytes % 0.6 0.0 - 5.0 %    Lymphocytes % 10.9 (L) 20.0 - 42.0 %    Monocytes % 7.9 2.0 - 12.0 %    Eosinophils % 0.2 0.0 - 6.0 %    Basophils % 0.2 0.0 - 2.0 %    Neutrophils Absolute 10.19 (H) 1.80 - 7.30 E9/L    Immature Granulocytes # 0.07 E9/L    Lymphocytes Absolute 1.38 (L) 1.50 - 4.00 E9/L    Monocytes Absolute 1.00 (H) 0.10 - 0.95 E9/L    Eosinophils Absolute 0.02 (L) 0.05 - 0.50 E9/L    Basophils Absolute 0.02 0.00 - 0.20 E9/L   Lactic Acid   Result Value Ref Range    Lactic Acid 1.8 0.5 - 2.2 mmol/L   Comprehensive Metabolic Panel w/ Reflex to MG   Result Value Ref Range    Sodium 135 132 - 146 mmol/L    Potassium reflex Magnesium 4.8 3.5 - 5.0 mmol/L    Chloride 104 98 - 107 mmol/L    CO2 20 (L) 22 - 29 mmol/L    Anion Gap 11 7 - 16 mmol/L    Glucose 103 (H) 74 - 99 mg/dL    BUN 26 (H) 6 - 23 mg/dL    CREATININE 1.1 0.7 - 1.2 mg/dL    GFR Non-African American >60 >=60 mL/min/1.73    GFR African American >60     Calcium 8.1 (L) 8.6 - 10.2 mg/dL    Total Protein 6.0 (L) 6.4 - 8.3 g/dL    Albumin 3.3 (L) 3.5 - 5.2 g/dL Total Bilirubin 0.5 0.0 - 1.2 mg/dL    Alkaline Phosphatase 48 40 - 129 U/L    ALT 32 0 - 40 U/L    AST 31 0 - 39 U/L       Radiology:  CT ABDOMEN PELVIS WO CONTRAST Additional Contrast? None   Final Result   Massively enlarged prostate measuring 10 x 9 x 10 cm with impression upon the   adjacent urinary bladder where there is soft tissue density which could   represent prostatic tissue although somewhat indistinct right lateral margin   series 3, image 166 partially pedunculated extending within the bladder lumen   along with bladder wall thickening. Trent catheter terminates within the   prostatic urethra segment with repositioning recommended. Consider   cystoscopy for further evaluation given irregularity of soft tissue density   and ill-defined margins of potential extraprostatic extension versus bladder   wall soft tissue mass             ------------------------- NURSING NOTES AND VITALS REVIEWED ---------------------------  Date / Time Roomed:  5/31/2022  2:29 PM  ED Bed Assignment:  LAKESHIA/LAKESHIA    The nursing notes within the ED encounter and vital signs as below have been reviewed. /77   Pulse 82   Temp 97 °F (36.1 °C) (Oral)   Resp 14   Ht 6' (1.829 m)   Wt 160 lb (72.6 kg)   SpO2 96%   BMI 21.70 kg/m²   Oxygen Saturation Interpretation: Normal      ------------------------------------------ PROGRESS NOTES ------------------------------------------  1:20 PM EDT  I have spoken with the patient and discussed todays results, in addition to providing specific details for the plan of care and counseling regarding the diagnosis and prognosis. Their questions are answered at this time and they are agreeable with the plan. I discussed at length with them reasons for immediate return here for re evaluation.  They will followup with their primary care physician by calling their office tomorrow and on Monday.      --------------------------------- ADDITIONAL PROVIDER NOTES ---------------------------------  At this time the patient is without objective evidence of an acute process requiring hospitalization or inpatient management. They have remained hemodynamically stable throughout their entire ED visit and are stable for discharge with outpatient follow-up. The plan has been discussed in detail and they are aware of the specific conditions for emergent return, as well as the importance of follow-up. Discharge Medication List as of 5/31/2022 10:14 PM      START taking these medications    Details   cefdinir (OMNICEF) 300 MG capsule Take 1 capsule by mouth 2 times daily for 10 days, Disp-20 capsule, R-0Print             Diagnosis:  1. Hematuria, unspecified type    2. Urinary tract infection with hematuria, site unspecified        Disposition:  Patient's disposition: Discharge to nursing home  Patient's condition is stable.              Marleny Dietrich MD  06/01/22 [id number]

## 2022-05-31 NOTE — ED NOTES
Irrigated with 500cc NS for pink urine return, clots removed moderate amt.       Sho Cuevas, RN  05/31/22 [id number], RN  05/31/22 [id number]

## 2022-06-01 NOTE — ED NOTES
This RN attempted to advance existing mann per physician request. Mann came out after removing 10 cc from balloon. New 18f three way cath inserted without difficulty. 30cc air inflated into balloon with no resistance noted. Red urine on return.       Alan Baumann, CHRISTY  05/31/22 [id number]

## 2022-06-01 NOTE — ED NOTES
Pt received full bag of bladder irrigation. Urine is clear, red. No clots noted. Pt denies abdominal pain or pressure. Trent continues to drain without difficulty.       Annmarie Nuñez RN  05/31/22 [id number]